# Patient Record
Sex: FEMALE | Race: WHITE | Employment: FULL TIME | URBAN - METROPOLITAN AREA
[De-identification: names, ages, dates, MRNs, and addresses within clinical notes are randomized per-mention and may not be internally consistent; named-entity substitution may affect disease eponyms.]

---

## 2018-01-02 ENCOUNTER — OFFICE VISIT (OUTPATIENT)
Dept: FAMILY MEDICINE CLINIC | Age: 28
End: 2018-01-02

## 2018-01-02 VITALS
SYSTOLIC BLOOD PRESSURE: 132 MMHG | WEIGHT: 125.2 LBS | BODY MASS INDEX: 20.86 KG/M2 | HEART RATE: 64 BPM | DIASTOLIC BLOOD PRESSURE: 84 MMHG | RESPIRATION RATE: 16 BRPM | OXYGEN SATURATION: 99 % | TEMPERATURE: 97.7 F | HEIGHT: 65 IN

## 2018-01-02 DIAGNOSIS — Z00.00 GENERAL MEDICAL EXAM: Primary | ICD-10-CM

## 2018-01-02 RX ORDER — BISMUTH SUBSALICYLATE 262 MG
1 TABLET,CHEWABLE ORAL DAILY
COMMUNITY
End: 2018-09-24

## 2018-01-02 NOTE — PROGRESS NOTES
Chief Complaint   Patient presents with   Belem Kenyon Establish Care     1. Have you been to the ER, urgent care clinic since your last visit? Hospitalized since your last visit? N/a new pt    2. Have you seen or consulted any other health care providers outside of the [address] [place] [address] since your last visit? Include any pap smears or colon screening. No     [facility]- Last PAP was in the summer of 2016 or 2017. Pt declines flu vaccine this visit.

## 2018-01-02 NOTE — PROGRESS NOTES
33 y.o. female presents for a physical.    No concerns about health. Considering pregnancy in the near future. History reviewed. No pertinent past medical history. 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 There are no preventive care reminders to display for this patient. Outpatient Prescriptions Marked as Taking for the 1/2/18 encounter (Office Visit) with Yolanda Wilder MD   Medication Sig Dispense Refill    INTRAUTERINE DEVICE, IUD, IU by IntraUTERine route.  multivitamin (ONE A DAY) tablet Take 1 Tab by mouth daily.  omega 3-dha-epa-fish oil (FISH OIL) 100-160-1,000 mg cap Take  by mouth.          No Known Allergies    Review of Systems:  Gen: no fatigue, fever, chills, weight loss or weight gain  Eyes: no excessive tearing, itching, or discharge  Nose: no rhinorrhea, no sinus pain  Mouth: no oral lesions, no sore throat  Resp: no shortness of breath, no wheezing, no cough  CV: no chest pain, no orthopnea, no paroxysmal nocturnal dyspnea, no lower extremity edema, no palpitations  Abd: no nausea, no heartburn, no diarrhea, no constipation, no abdominal pain  Neuro: no headaches, no syncope or presyncopal episodes  Endo: no polyuria, no polydipsia  Heme: no lymphadenopathy, no easy bruising or bleeding    Visit Vitals    /84 (BP 1 Location: Left arm, BP Patient Position: Sitting)    Pulse 64    Temp 97.7 °F (36.5 °C) (Oral)    Resp 16    Ht 5' 5\" (1.651 m)    Wt 125 lb 3.2 oz (56.8 kg)    SpO2 99%    BMI 20.83 kg/m2     Gen: alert, oriented, no acute distress  Ears: external auditory canals clear, TMs without erythema or effusion  Eyes: pupils equal round reactive to light, sclera clear, conjunctiva clear  Oral: moist mucus membranes, no oral lesions, no pharyngeal inflammation or exudate  Neck: thyroid symmetric and not enlarged, no carotid bruits, no jugular vein distention  Resp: no increase work of breathing, lungs clear to ausculation bilaterally, no wheezing, rales or rhonchi  CV: S1, S2 normal. No murmurs, rubs, or gallops. Abd: soft, not tender, not distended. No hepatosplenomegaly. Normal bowel sounds. No hernias. Neuro: cranial nerves intact, normal strength and movement in all extremities, reflexes and sensation intact and symmetric. Skin: no lesion or rash  Extremities: no cyanosis or edema    Assessment/Plan:    1. General medical exam  At goal.  Continue current medications. - CBC WITH AUTOMATED DIFF  - METABOLIC PANEL, COMPREHENSIVE  - TSH 3RD GENERATION  - LIPID PANEL    Health Maintenance reviewed - updated    Orders Placed This Encounter    CBC WITH AUTOMATED DIFF    METABOLIC PANEL, COMPREHENSIVE    TSH 3RD GENERATION    LIPID PANEL    INTRAUTERINE DEVICE, IUD, IU     Sig: by IntraUTERine route.  multivitamin (ONE A DAY) tablet     Sig: Take 1 Tab by mouth daily.  DISCONTD: MULTIVITAMIN WITH MINERALS (HAIR,SKIN AND NAILS PO)     Sig: Take  by mouth.  omega 3-dha-epa-fish oil (FISH OIL) 100-160-1,000 mg cap     Sig: Take  by mouth. Medications Discontinued During This Encounter   Medication Reason    traMADol (ULTRAM) 50 mg tablet Not A Current Medication    MULTIVITAMIN WITH MINERALS (HAIR,SKIN AND NAILS PO) Not A Current Medication       Current Outpatient Prescriptions   Medication Sig Dispense Refill    INTRAUTERINE DEVICE, IUD, IU by IntraUTERine route.       multivitamin (ONE A DAY) tablet Take 1 Tab by mouth daily.  omega 3-dha-epa-fish oil (FISH OIL) 100-160-1,000 mg cap Take  by mouth. Verbal and written instructions (see AVS) provided. Patient expresses understanding of diagnosis and treatment plan.

## 2018-01-02 NOTE — PATIENT INSTRUCTIONS

## 2018-01-02 NOTE — MR AVS SNAPSHOT
Visit Information Date & Time Provider Department Dept. Phone Encounter #  
 1/2/2018  8:30 AM Cristhian BertrandRonel [facility] [phone] [id number] Upcoming Health Maintenance Date Due  
 PAP AKA CERVICAL CYTOLOGY 7/2/2019 DTaP/Tdap/Td series (2 - Td) 3/5/2026 Allergies as of 1/2/2018  Review Complete On: 1/2/2018 By: Cristhian Bertrand MD  
 No Known Allergies Current Immunizations  Never Reviewed Name Date HPV 7/1/2006, 3/1/2006, 1/1/2006 Tdap 3/5/2016  6:47 PM  
  
 Not reviewed this visit You Were Diagnosed With   
  
 Codes Comments General medical exam    -  Primary ICD-10-CM: Z00.00 ICD-9-CM: V70.9 Vitals BP Pulse Temp Resp Height(growth percentile) Weight(growth percentile) 132/84 (BP 1 Location: Left arm, BP Patient Position: Sitting) 64 97.7 °F (36.5 °C) (Oral) 16 5' 5\" (1.651 m) 125 lb 3.2 oz (56.8 kg) SpO2 BMI OB Status Smoking Status 99% 20.83 kg/m2 IUD Never Smoker Vitals History BMI and BSA Data Body Mass Index Body Surface Area  
 20.83 kg/m 2 1.61 m 2 Your Updated Medication List  
  
   
This list is accurate as of: 1/2/18  8:51 AM.  Always use your most recent med list.  
  
  
  
  
 FISH -160-1,000 mg Cap Generic drug:  omega 3-dha-epa-fish oil Take  by mouth. INTRAUTERINE DEVICE (IUD) IU  
by IntraUTERine route. multivitamin tablet Commonly known as:  ONE A DAY Take 1 Tab by mouth daily. We Performed the Following CBC WITH AUTOMATED DIFF [50035 CPT(R)] LIPID PANEL [55480 CPT(R)] METABOLIC PANEL, COMPREHENSIVE [39706 CPT(R)] TSH 3RD GENERATION [69702 CPT(R)] Patient Instructions Well Visit, Ages 25 to 48: Care Instructions Your Care Instructions Physical exams can help you stay healthy. Your doctor has checked your overall health and may have suggested ways to take good care of yourself. He or she also may have recommended tests. At home, you can help prevent illness with healthy eating, regular exercise, and other steps. Follow-up care is a key part of your treatment and safety. Be sure to make and go to all appointments, and call your doctor if you are having problems. It's also a good idea to know your test results and keep a list of the medicines you take. How can you care for yourself at home? · Reach and stay at a healthy weight. This will lower your risk for many problems, such as obesity, diabetes, heart disease, and high blood pressure. · Get at least 30 minutes of physical activity on most days of the week. Walking is a good choice. You also may want to do other activities, such as running, swimming, cycling, or playing tennis or team sports. Discuss any changes in your exercise program with your doctor. · Do not smoke or allow others to smoke around you. If you need help quitting, talk to your doctor about stop-smoking programs and medicines. These can increase your chances of quitting for good. · Talk to your doctor about whether you have any risk factors for sexually transmitted infections (STIs). Having one sex partner (who does not have STIs and does not have sex with anyone else) is a good way to avoid these infections. · Use birth control if you do not want to have children at this time. Talk with your doctor about the choices available and what might be best for you. · Protect your skin from too much sun. When you're outdoors from 10 a.m. to 4 p.m., stay in the shade or cover up with clothing and a hat with a wide brim. Wear sunglasses that block UV rays. Even when it's cloudy, put broad-spectrum sunscreen (SPF 30 or higher) on any exposed skin. · See a dentist one or two times a year for checkups and to have your teeth cleaned. · Wear a seat belt in the car. · Drink alcohol in moderation, if at all.  That means no more than 2 drinks a day for men and 1 drink a day for women. Follow your doctor's advice about when to have certain tests. These tests can spot problems early. For everyone · Cholesterol. Have the fat (cholesterol) in your blood tested after age 21. Your doctor will tell you how often to have this done based on your age, family history, or other things that can increase your risk for heart disease. · Blood pressure. Have your blood pressure checked during a routine doctor visit. Your doctor will tell you how often to check your blood pressure based on your age, your blood pressure results, and other factors. · Vision. Talk with your doctor about how often to have a glaucoma test. 
· Diabetes. Ask your doctor whether you should have tests for diabetes. · Colon cancer. Have a test for colon cancer at age 48. You may have one of several tests. If you are younger than 48, you may need a test earlier if you have any risk factors. Risk factors include whether you already had a precancerous polyp removed from your colon or whether your parent, brother, sister, or child has had colon cancer. For women · Breast exam and mammogram. Talk to your doctor about when you should have a clinical breast exam and a mammogram. Medical experts differ on whether and how often women under 50 should have these tests. Your doctor can help you decide what is right for you. · Pap test and pelvic exam. Begin Pap tests at age 24. A Pap test is the best way to find cervical cancer. The test often is part of a pelvic exam. Ask how often to have this test. 
· Tests for sexually transmitted infections (STIs). Ask whether you should have tests for STIs. You may be at risk if you have sex with more than one person, especially if your partners do not wear condoms. For men · Tests for sexually transmitted infections (STIs). Ask whether you should have tests for STIs.  You may be at risk if you have sex with more than one person, especially if you do not wear a condom. · Testicular cancer exam. Ask your doctor whether you should check your testicles regularly. · Prostate exam. Talk to your doctor about whether you should have a blood test (called a PSA test) for prostate cancer. Experts differ on whether and when men should have this test. Some experts suggest it if you are older than 39 and are -American or have a father or brother who got prostate cancer when he was younger than 72. When should you call for help? Watch closely for changes in your health, and be sure to contact your doctor if you have any problems or symptoms that concern you. Where can you learn more? Go to http://lauren-brent.info/. Enter P072 in the search box to learn more about \"Well Visit, Ages 25 to 48: Care Instructions. \" Current as of: May 12, 2017 Content Version: 11.4 © 3463-7915 Inter[employer]u Me. Care instructions adapted under license by [employer] (which disclaims liability or warranty for this information). If you have questions about a medical condition or this instruction, always ask your healthcare professional. [address] any warranty or liability for your use of this information. Introducing [facility]! Julien Santana introduces WaterSmart Software patient portal. Now you can access parts of your medical record, email your doctor's office, and request medication refills online. 1. In your internet browser, go to [email or web address] 2. Click on the First Time User? Click Here link in the Sign In box. You will see the New Member Sign Up page. 3. Enter your WaterSmart Software Access Code exactly as it appears below. You will not need to use this code after youve completed the sign-up process. If you do not sign up before the expiration date, you must request a new code. · WaterSmart Software Access Code: [email or web address] Expires: 4/2/2018  8:51 AM 
 
 4. Enter the last four digits of your Social Security Number (xxxx) and Date of Birth (mm/dd/yyyy) as indicated and click Submit. You will be taken to the next sign-up page. 5. Create a eBay ID. This will be your eBay login ID and cannot be changed, so think of one that is secure and easy to remember. 6. Create a eBay password. You can change your password at any time. 7. Enter your Password Reset Question and Answer. This can be used at a later time if you forget your password. 8. Enter your e-mail address. You will receive e-mail notification when new information is available in [address] 9. Click Sign Up. You can now view and download portions of your medical record. 10. Click the Download Summary menu link to download a portable copy of your medical information. If you have questions, please visit the Frequently Asked Questions section of the eBay website. Remember, eBay is NOT to be used for urgent needs. For medical emergencies, dial 911. Now available from your iPhone and Android! Please provide this summary of care documentation to your next provider. Your primary care clinician is listed as Cristhian Bertrand. If you have any questions after today's visit, please call [phone].

## 2018-01-03 LAB
ALBUMIN SERPL-MCNC: 4.3 G/DL (ref 3.5–5.5)
ALBUMIN/GLOB SERPL: 1.8 {RATIO} (ref 1.2–2.2)
ALP SERPL-CCNC: 62 IU/L (ref 39–117)
ALT SERPL-CCNC: 14 IU/L (ref 0–32)
AST SERPL-CCNC: 18 IU/L (ref 0–40)
BASOPHILS # BLD AUTO: 0 X10E3/UL (ref 0–0.2)
BASOPHILS NFR BLD AUTO: 0 %
BILIRUB SERPL-MCNC: 0.7 MG/DL (ref 0–1.2)
BUN SERPL-MCNC: 15 MG/DL (ref 6–20)
BUN/CREAT SERPL: 18 (ref 9–23)
CALCIUM SERPL-MCNC: 9.5 MG/DL (ref 8.7–10.2)
CHLORIDE SERPL-SCNC: 102 MMOL/L (ref 96–106)
CHOLEST SERPL-MCNC: 174 MG/DL (ref 100–199)
CO2 SERPL-SCNC: 27 MMOL/L (ref 18–29)
CREAT SERPL-MCNC: 0.85 MG/DL (ref 0.57–1)
EOSINOPHIL # BLD AUTO: 0.1 X10E3/UL (ref 0–0.4)
EOSINOPHIL NFR BLD AUTO: 2 %
ERYTHROCYTE [DISTWIDTH] IN BLOOD BY AUTOMATED COUNT: 13.3 % (ref 12.3–15.4)
GLOBULIN SER CALC-MCNC: 2.4 G/DL (ref 1.5–4.5)
GLUCOSE SERPL-MCNC: 84 MG/DL (ref 65–99)
HCT VFR BLD AUTO: 43.2 % (ref 34–46.6)
HDLC SERPL-MCNC: 83 MG/DL
HGB BLD-MCNC: 14.8 G/DL (ref 11.1–15.9)
IMM GRANULOCYTES # BLD: 0 X10E3/UL (ref 0–0.1)
IMM GRANULOCYTES NFR BLD: 0 %
INTERPRETATION, 910389: NORMAL
LDLC SERPL CALC-MCNC: 77 MG/DL (ref 0–99)
LYMPHOCYTES # BLD AUTO: 1.9 X10E3/UL (ref 0.7–3.1)
LYMPHOCYTES NFR BLD AUTO: 33 %
MCH RBC QN AUTO: 32 PG (ref 26.6–33)
MCHC RBC AUTO-ENTMCNC: 34.3 G/DL (ref 31.5–35.7)
MCV RBC AUTO: 93 FL (ref 79–97)
MONOCYTES # BLD AUTO: 0.5 X10E3/UL (ref 0.1–0.9)
MONOCYTES NFR BLD AUTO: 8 %
NEUTROPHILS # BLD AUTO: 3.3 X10E3/UL (ref 1.4–7)
NEUTROPHILS NFR BLD AUTO: 57 %
PLATELET # BLD AUTO: 275 X10E3/UL (ref 150–379)
POTASSIUM SERPL-SCNC: 5.7 MMOL/L (ref 3.5–5.2)
PROT SERPL-MCNC: 6.7 G/DL (ref 6–8.5)
RBC # BLD AUTO: 4.63 X10E6/UL (ref 3.77–5.28)
SODIUM SERPL-SCNC: 141 MMOL/L (ref 134–144)
TRIGL SERPL-MCNC: 71 MG/DL (ref 0–149)
TSH SERPL DL<=0.005 MIU/L-ACNC: 2.3 UIU/ML (ref 0.45–4.5)
VLDLC SERPL CALC-MCNC: 14 MG/DL (ref 5–40)
WBC # BLD AUTO: 5.8 X10E3/UL (ref 3.4–10.8)

## 2018-01-04 ENCOUNTER — TELEPHONE (OUTPATIENT)
Dept: FAMILY MEDICINE CLINIC | Age: 28
End: 2018-01-04

## 2018-01-04 DIAGNOSIS — E87.5 SERUM POTASSIUM ELEVATED: Primary | ICD-10-CM

## 2018-01-05 ENCOUNTER — LAB ONLY (OUTPATIENT)
Dept: FAMILY MEDICINE CLINIC | Age: 28
End: 2018-01-05

## 2018-01-06 LAB
BUN SERPL-MCNC: 13 MG/DL (ref 6–20)
BUN/CREAT SERPL: 14 (ref 9–23)
CALCIUM SERPL-MCNC: 9.3 MG/DL (ref 8.7–10.2)
CHLORIDE SERPL-SCNC: 101 MMOL/L (ref 96–106)
CO2 SERPL-SCNC: 24 MMOL/L (ref 18–29)
CREAT SERPL-MCNC: 0.94 MG/DL (ref 0.57–1)
GLUCOSE SERPL-MCNC: 69 MG/DL (ref 65–99)
POTASSIUM SERPL-SCNC: 4 MMOL/L (ref 3.5–5.2)
SODIUM SERPL-SCNC: 140 MMOL/L (ref 134–144)

## 2018-02-09 ENCOUNTER — OFFICE VISIT (OUTPATIENT)
Dept: FAMILY MEDICINE CLINIC | Age: 28
End: 2018-02-09

## 2018-02-09 VITALS
HEART RATE: 78 BPM | HEIGHT: 65 IN | BODY MASS INDEX: 21.16 KG/M2 | RESPIRATION RATE: 18 BRPM | WEIGHT: 127 LBS | DIASTOLIC BLOOD PRESSURE: 82 MMHG | TEMPERATURE: 98.1 F | SYSTOLIC BLOOD PRESSURE: 127 MMHG

## 2018-02-09 DIAGNOSIS — B35.1 TOENAIL FUNGUS: Primary | ICD-10-CM

## 2018-02-09 RX ORDER — TERBINAFINE HYDROCHLORIDE 250 MG/1
250 TABLET ORAL DAILY
Qty: 90 TAB | Refills: 1 | Status: SHIPPED | OUTPATIENT
Start: 2018-02-09 | End: 2018-02-09 | Stop reason: SDUPTHER

## 2018-02-09 RX ORDER — TERBINAFINE HYDROCHLORIDE 250 MG/1
250 TABLET ORAL DAILY
Qty: 90 TAB | Refills: 1 | Status: SHIPPED | OUTPATIENT
Start: 2018-02-09 | End: 2018-09-24

## 2018-02-09 NOTE — MR AVS SNAPSHOT
[facility] 
 
 
 [address] [place] [address] 
[phone] Patient: Garcia Blackman MRN: [id number] Visit Information Date & Time Provider Department Dept. Phone Encounter #  
 2/9/2018  4:00 PM Ronel Pickett [place] [id number] [phone] [id number] Upcoming Health Maintenance Date Due  
 PAP AKA CERVICAL CYTOLOGY 7/2/2019 DTaP/Tdap/Td series (2 - Td) 3/5/2026 Allergies as of 2/9/2018  Review Complete On: 2/9/2018 By: Nita Fields No Known Allergies Current Immunizations  Never Reviewed Name Date HPV 7/1/2006, 3/1/2006, 1/1/2006 Tdap 3/5/2016  6:47 PM  
  
 Not reviewed this visit You Were Diagnosed With   
  
 Codes Comments Toenail fungus    -  Primary ICD-10-CM: B35.1 ICD-9-CM: 110.1 Vitals BP Pulse Temp Resp Height(growth percentile) Weight(growth percentile) 127/82 (BP 1 Location: Left arm, BP Patient Position: Sitting) 78 98.1 °F (36.7 °C) (Oral) 18 5' 5\" (1.651 m) 127 lb (57.6 kg) BMI OB Status Smoking Status 21.13 kg/m2 IUD Never Smoker BMI and BSA Data Body Mass Index Body Surface Area  
 21.13 kg/m 2 1.63 m 2 Preferred Pharmacy Pharmacy Name Phone [facility] [phone] Your Updated Medication List  
  
   
This list is accurate as of: 2/9/18  4:48 PM.  Always use your most recent med list.  
  
  
  
  
 FISH -160-1,000 mg Cap Generic drug:  omega 3-dha-epa-fish oil Take  by mouth. INTRAUTERINE DEVICE (IUD) IU  
by IntraUTERine route. multivitamin tablet Commonly known as:  ONE A DAY Take 1 Tab by mouth daily. terbinafine HCl 250 mg tablet Commonly known as:  LAMISIL Take 1 Tab by mouth daily. Prescriptions Printed  Refills  
 terbinafine HCl (LAMISIL) 250 mg tablet 1  
 Sig: Take 1 Tab by mouth daily. Class: Print Route: Oral  
  
Patient Instructions Toenail Fungus: Care Instructions Your Care Instructions A toenail that is infected by a fungus usually turns white or yellow. As the fungus spreads, the nail turns a darker color and gets thicker, and its edges start to turn ragged and crumble. A bad infection can cause toe pain, and the nail may pull away from the toe. Toenails that are exposed to moisture and warmth a lot are more likely to get infected by a fungus. This can happen from wearing sweaty shoes often and from walking barefoot on shower floors. It is hard to treat toenail fungus, and the infection can return after it has cleared up. But medicines can sometimes get rid of toenail fungus for good. If the infection is very bad, or if it causes a lot of pain, you may need to have the nail removed. Follow-up care is a key part of your treatment and safety. Be sure to make and go to all appointments, and call your doctor if you are having problems. It's also a good idea to know your test results and keep a list of the medicines you take. How can you care for yourself at home? · Take your medicines exactly as prescribed. Call your doctor if you have any problems with your medicine. You will get more details on the specific medicines your doctor prescribes. · If your doctor gave you a cream or liquid to put on your toenail, use it exactly as directed. · Wash your feet often, and wash your hands after touching your feet. · Keep your toenails clean and dry. Dry your feet completely after you bathe and before you put on shoes and socks. · Keep your toenails trimmed. · Change socks often. Wear dry socks that absorb moisture. · Do not go barefoot in public places. · Use a spray or powder that fights fungus on your feet and in your shoes. · Do not pick at the skin around your nails. · Do not use nail polish or fake nails on your toenails. When should you call for help? Call your doctor now or seek immediate medical care if: 
? · You have signs of infection, such as: 
¨ Increased pain, swelling, warmth, or redness. ¨ Red streaks leading from the site. ¨ Pus draining from the site. ¨ A fever. ? · You have new or increased toe pain. ? Watch closely for changes in your health, and be sure to contact your doctor if: 
? · You do not get better as expected. Where can you learn more? Go to http://lauren-brent.info/. Enter D202 in the search box to learn more about \"Toenail Fungus: Care Instructions. \" Current as of: October 13, 2016 Content Version: 11.4 © 1778-7113 [employer]. Care instructions adapted under license by [employer] (which disclaims liability or warranty for this information). If you have questions about a medical condition or this instruction, always ask your healthcare professional. [place] [address] any warranty or liability for your use of this information. Introducing [facility]! [facility] introduces BuddyBet patient portal. Now you can access parts of your medical record, email your doctor's office, and request medication refills online. 1. In your internet browser, go to [email or web address] 2. Click on the First Time User? Click Here link in the Sign In box. You will see the New Member Sign Up page. 3. Enter your BuddyBet Access Code exactly as it appears below. You will not need to use this code after youve completed the sign-up process. If you do not sign up before the expiration date, you must request a new code. · BuddyBet Access Code: [email or web address] Expires: 4/2/2018  8:51 AM 
 
4. Enter the last four digits of your Social Security Number (xxxx) and Date of Birth (mm/dd/yyyy) as indicated and click Submit. You will be taken to the next sign-up page. 5. Create a Advanced Ballistic Concepts ID. This will be your Advanced [employer]s login ID and cannot be changed, so think of one that is secure and easy to remember. 6. Create a [employer] password. You can change your password at any time. 7. Enter your Password Reset Question and Answer. This can be used at a later time if you forget your password. 8. Enter your e-mail address. You will receive e-mail notification when new information is available in [address] 9. Click Sign Up. You can now view and download portions of your medical record. 10. Click the Download Summary menu link to download a portable copy of your medical information. If you have questions, please visit the Frequently Asked Questions section of the [employer] website. Remember, [employer] is NOT to be used for urgent needs. For medical emergencies, dial 911. Now available from your iPhone and Android! Please provide this summary of care documentation to your next provider. Your primary care clinician is listed as Gloria Lopez. If you have any questions after today's visit, please call [phone].

## 2018-02-09 NOTE — PROGRESS NOTES
Chief Complaint   Patient presents with    Nail Problem     possible toenail infection      Health Maintenance reviewed

## 2018-02-09 NOTE — PATIENT INSTRUCTIONS
Toenail Fungus: Care Instructions  Your Care Instructions  A toenail that is infected by a fungus usually turns white or yellow. As the fungus spreads, the nail turns a darker color and gets thicker, and its edges start to turn ragged and crumble. A bad infection can cause toe pain, and the nail may pull away from the toe. Toenails that are exposed to moisture and warmth a lot are more likely to get infected by a fungus. This can happen from wearing sweaty shoes often and from walking barefoot on shower floors. It is hard to treat toenail fungus, and the infection can return after it has cleared up. But medicines can sometimes get rid of toenail fungus for good. If the infection is very bad, or if it causes a lot of pain, you may need to have the nail removed. Follow-up care is a key part of your treatment and safety. Be sure to make and go to all appointments, and call your doctor if you are having problems. It's also a good idea to know your test results and keep a list of the medicines you take. How can you care for yourself at home? · Take your medicines exactly as prescribed. Call your doctor if you have any problems with your medicine. You will get more details on the specific medicines your doctor prescribes. · If your doctor gave you a cream or liquid to put on your toenail, use it exactly as directed. · Wash your feet often, and wash your hands after touching your feet. · Keep your toenails clean and dry. Dry your feet completely after you bathe and before you put on shoes and socks. · Keep your toenails trimmed. · Change socks often. Wear dry socks that absorb moisture. · Do not go barefoot in public places. · Use a spray or powder that fights fungus on your feet and in your shoes. · Do not pick at the skin around your nails. · Do not use nail polish or fake nails on your toenails. When should you call for help?   Call your doctor now or seek immediate medical care if:  ? · You have signs of infection, such as:  ¨ Increased pain, swelling, warmth, or redness. ¨ Red streaks leading from the site. ¨ Pus draining from the site. ¨ A fever. ? · You have new or increased toe pain. ? Watch closely for changes in your health, and be sure to contact your doctor if:  ? · You do not get better as expected. Where can you learn more? Go to http://lauren-brent.info/. Enter D202 in the search box to learn more about \"Toenail Fungus: Care Instructions. \"  Current as of: October 13, 2016  Content Version: 11.4  © 0599-3983 digiSchool. Care instructions adapted under license by [employer] (which disclaims liability or warranty for this information). If you have questions about a medical condition or this instruction, always ask your healthcare professional. [address] any warranty or liability for your use of this information.

## 2018-02-13 NOTE — PROGRESS NOTES
Subjective:   Nannette Barnett is a 33 y.o. female who complains of fungal nail changes on left fourth and fifth toenail      No past medical history on file. Social History   Substance Use Topics    Smoking status: Never Smoker    Smokeless tobacco: Never Used    Alcohol use Yes      Comment: occasionally      Outpatient Prescriptions Marked as Taking for the 2/9/18 encounter (Office Visit) with Gabbi Felipe MD   Medication Sig Dispense Refill    terbinafine HCl (LAMISIL) 250 mg tablet Take 1 Tab by mouth daily. 90 Tab 1    INTRAUTERINE DEVICE, IUD, IU by IntraUTERine route.  multivitamin (ONE A DAY) tablet Take 1 Tab by mouth daily.  omega 3-dha-epa-fish oil (FISH OIL) 100-160-1,000 mg cap Take  by mouth. No Known Allergies     Review of Systems  A comprehensive review of systems was negative except for that written in the HPI. Objective:     Visit Vitals    /82 (BP 1 Location: Left arm, BP Patient Position: Sitting)    Pulse 78    Temp 98.1 °F (36.7 °C) (Oral)    Resp 18    Ht 5' 5\" (1.651 m)    Wt 127 lb (57.6 kg)    BMI 21.13 kg/m2     General:   alert, cooperative, ill-appearing   Eyes: conjunctivae/scleras clear. PERRL, EOM's intact                   Heart: S1 and S2 normal,no murmurs noted    Lungs: Coarse to auscultation bilaterally, no increased work of breathing       Extremities: thicked crumbling nails left 4th and 5th toe          Previous CMP is normal      Assessment/Plan:       ICD-10-CM ICD-9-CM    1. Toenail fungus B35.1 110.1              Orders Placed This Encounter    DISCONTD: terbinafine HCl (LAMISIL) 250 mg tablet     Sig: Take 1 Tab by mouth daily. Dispense:  90 Tab     Refill:  1    terbinafine HCl (LAMISIL) 250 mg tablet     Sig: Take 1 Tab by mouth daily. Dispense:  90 Tab     Refill:  1       Verbal and written instructions (see AVS) provided. Patient expresses understanding of diagnosis and treatment plan.

## 2018-09-24 ENCOUNTER — OFFICE VISIT (OUTPATIENT)
Dept: FAMILY MEDICINE CLINIC | Age: 28
End: 2018-09-24

## 2018-09-24 VITALS
DIASTOLIC BLOOD PRESSURE: 76 MMHG | BODY MASS INDEX: 21.16 KG/M2 | HEART RATE: 60 BPM | SYSTOLIC BLOOD PRESSURE: 119 MMHG | HEIGHT: 65 IN | OXYGEN SATURATION: 98 % | WEIGHT: 127 LBS | RESPIRATION RATE: 14 BRPM | TEMPERATURE: 98.2 F

## 2018-09-24 DIAGNOSIS — J02.9 SORE THROAT: Primary | ICD-10-CM

## 2018-09-24 DIAGNOSIS — J06.9 UPPER RESPIRATORY TRACT INFECTION, UNSPECIFIED TYPE: ICD-10-CM

## 2018-09-24 LAB
S PYO AG THROAT QL: NEGATIVE
VALID INTERNAL CONTROL?: YES

## 2018-09-24 NOTE — PROGRESS NOTES
Chief Complaint   Patient presents with    Nasal Congestion    Sinus Pain    Hoarse     Patient is here to be seen for URI symptoms. She is a . Wants to be tested for strep. 7 weeks pregnant. Seen at [facility].

## 2018-09-24 NOTE — MR AVS SNAPSHOT
Curtis Mcguire 
 
 
 [address] [place] [address] 
[phone] Patient: Meg Lake MRN: [id number] BSW:[birth year] Visit Information Date & Time Provider Department Dept. Phone Encounter #  
 9/24/2018  3:30 PM Piotr Ramirez, [address] [address] [phone] [id number] Upcoming Health Maintenance Date Due Influenza Age 5 to Adult 8/1/2018 PAP AKA CERVICAL CYTOLOGY 7/2/2019 DTaP/Tdap/Td series (2 - Td) 3/5/2026 Allergies as of 9/24/2018  Review Complete On: 9/24/2018 By: Piotr Ramirez NP No Known Allergies Current Immunizations  Never Reviewed Name Date HPV 7/1/2006, 3/1/2006, 1/1/2006 Tdap 3/5/2016  6:47 PM  
  
 Not reviewed this visit You Were Diagnosed With   
  
 Codes Comments Sore throat    -  Primary ICD-10-CM: J02.9 ICD-9-CM: 731 Upper respiratory tract infection, unspecified type     ICD-10-CM: J06.9 ICD-9-CM: 465.9 Vitals BP Pulse Temp Resp Height(growth percentile) Weight(growth percentile) 119/76 (BP 1 Location: Left arm, BP Patient Position: Sitting) 60 98.2 °F (36.8 °C) (Oral) 14 5' 5\" (1.651 m) 127 lb (57.6 kg) SpO2 BMI OB Status Smoking Status 98% 21.13 kg/m2 Pregnant Never Smoker BMI and BSA Data Body Mass Index Body Surface Area  
 21.13 kg/m 2 1.63 m 2 Preferred Pharmacy Pharmacy Name Phone [facility] [address] [phone] Your Updated Medication List  
  
   
This list is accurate as of 9/24/18  4:37 PM.  Always use your most recent med list.  
  
  
  
  
 PNV38-Iron Cbn&Gluc-FA-DSS-DHA 35-1- mg Cmpk Take  by mouth. We Performed the Following AMB POC RAPID STREP A [15880 CPT(R)] Patient Instructions Sore Throat: Care Instructions Your Care Instructions Infection by bacteria or a virus causes most sore throats. Cigarette smoke, dry air, air pollution, allergies, and yelling can also cause a sore throat. Sore throats can be painful and annoying. Fortunately, most sore throats go away on their own. If you have a bacterial infection, your doctor may prescribe antibiotics. Follow-up care is a key part of your treatment and safety. Be sure to make and go to all appointments, and call your doctor if you are having problems. It's also a good idea to know your test results and keep a list of the medicines you take. How can you care for yourself at home? · If your doctor prescribed antibiotics, take them as directed. Do not stop taking them just because you feel better. You need to take the full course of antibiotics. · Gargle with warm salt water once an hour to help reduce swelling and relieve discomfort. Use 1 teaspoon of salt mixed in 1 cup of warm water. · Take an over-the-counter pain medicine, such as acetaminophen (Tylenol), ibuprofen (Advil, Motrin), or naproxen (Aleve). Read and follow all instructions on the label. · Be careful when taking over-the-counter cold or flu medicines and Tylenol at the same time. Many of these medicines have acetaminophen, which is Tylenol. Read the labels to make sure that you are not taking more than the recommended dose. Too much acetaminophen (Tylenol) can be harmful. · Drink plenty of fluids. Fluids may help soothe an irritated throat. Hot fluids, such as tea or soup, may help decrease throat pain. · Use over-the-counter throat lozenges to soothe pain. Regular cough drops or hard candy may also help. These should not be given to young children because of the risk of choking. · Do not smoke or allow others to smoke around you. If you need help quitting, talk to your doctor about stop-smoking programs and medicines. These can increase your chances of quitting for good. · Use a vaporizer or humidifier to add moisture to your bedroom. Follow the directions for cleaning the machine. When should you call for help? Call your doctor now or seek immediate medical care if: 
  · You have new or worse trouble swallowing.  
  · Your sore throat gets much worse on one side.  
 Watch closely for changes in your health, and be sure to contact your doctor if you do not get better as expected. Where can you learn more? Go to http://lauren-brent.info/. Enter [id number] in the search box to learn more about \"Sore Throat: Care Instructions. \" Current as of: May 12, 2017 Content Version: 11.7 © 8269-4413 [employer]. Care instructions adapted under license by [employer] (which disclaims liability or warranty for this information). If you have questions about a medical condition or this instruction, always ask your healthcare professional. [place] [address] any warranty or liability for your use of this information. Saline Nasal Washes: Care Instructions Your Care Instructions Saline nasal washes help keep the nasal passages open by washing out thick or dried mucus. This simple remedy can help relieve symptoms of allergies, sinusitis, and colds. It also can make the nose feel more comfortable by keeping the mucous membranes moist. You may notice a little burning sensation in your nose the first few times you use the solution, but this usually gets better in a few days. Follow-up care is a key part of your treatment and safety. Be sure to make and go to all appointments, and call your doctor if you are having problems. It's also a good idea to know your test results and keep a list of the medicines you take. How can you care for yourself at home? · You can buy premixed saline solution in a squeeze bottle or other sinus rinse products at a drugstore. Read and follow the instructions on the label. · You also can make your own saline solution by adding 1 teaspoon of salt and 1 teaspoon of baking soda to 2 cups of distilled water. · If you use a homemade solution, pour a small amount into a clean bowl. Using a rubber bulb syringe, squeeze the syringe and place the tip in the salt water. Pull a small amount of the salt water into the syringe by relaxing your hand. · Sit down with your head tilted slightly back. Do not lie down. Put the tip of the bulb syringe or the squeeze bottle a little way into one of your nostrils. Gently drip or squirt a few drops into the nostril. Repeat with the other nostril. Some sneezing and gagging are normal at first. 
· Gently blow your nose. · Wipe the syringe or bottle tip clean after each use. · Repeat this 2 or 3 times a day. · Use nasal washes gently if you have nosebleeds often. When should you call for help? Watch closely for changes in your health, and be sure to contact your doctor if: 
  · You often get nosebleeds.  
  · You have problems doing the nasal washes. Where can you learn more? Go to http://lauren-brent.info/. Enter [id number] [id number] [phone] in the search box to learn more about \"Saline Nasal Washes: Care Instructions. \" Current as of: May 12, 2017 Content Version: 11.7 © 8465-8879 [employer]. Care instructions adapted under license by [employer] (which disclaims liability or warranty for this information). If you have questions about a medical condition or this instruction, always ask your healthcare professional. [address] any warranty or liability for your use of this information. Introducing [facility]! [facility] introduces Shut Down patient portal. Now you can access parts of your medical record, email your doctor's office, and request medication refills online. 1. In your internet browser, go to [email or web address] 2. Click on the First Time User? Click Here link in the Sign In box. You will see the New Member Sign Up page. 3. Enter your MOGL Access Code exactly as it appears below. You will not need to use this code after youve completed the sign-up process. If you do not sign up before the expiration date, you must request a new code. · MOGL Access Code: [email or web address] Expires: 12/23/2018  4:37 PM 
 
4. Enter the last four digits of your Social Security Number (xxxx) and Date of Birth (mm/dd/yyyy) as indicated and click Submit. You will be taken to the next sign-up page. 5. Create a MOGL ID. This will be your MOGL login ID and cannot be changed, so think of one that is secure and easy to remember. 6. Create a MOGL password. You can change your password at any time. 7. Enter your Password Reset Question and Answer. This can be used at a later time if you forget your password. 8. Enter your e-mail address. You will receive e-mail notification when new information is available in [address] 9. Click Sign Up. You can now view and download portions of your medical record. 10. Click the Download Summary menu link to download a portable copy of your medical information. If you have questions, please visit the Frequently Asked Questions section of the MOGL website. Remember, MOGL is NOT to be used for urgent needs. For medical emergencies, dial 911. Now available from your iPhone and Android! Please provide this summary of care documentation to your next provider. Your primary care clinician is listed as Stephanie Ramos. If you have any questions after today's visit, please call [phone].

## 2018-09-24 NOTE — PROGRESS NOTES
Subjective:     Chief Complaint   Patient presents with    Nasal Congestion    Sinus Pain    Shawn Rust is a 33 y.o. female who complains of congestion, sore throat, post nasal drip, bilateral upper sinus pain, fatigue and clear nasal discharge for 4 days, gradually worsening since that time. She is 7 weeks pregnant. Not taking OTC cold remedies. Denies cardiac complaints including chest pain or discomfort, elevated heart rate, or palpitations. Denies respiratory complaints including SOB, difficulty or pain with breathing, wheezes, and cough. Denies fever, myalgias, chills, weakness, fatigue and other systemic symptoms. No N/V/D  ROS is otherwise negative. No evaluation to date. No recent travel. 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 No past medical history on file. Social History   Substance Use Topics    Smoking status: Never Smoker    Smokeless tobacco: Never Used    Alcohol use Yes      Comment: occasionally      No current outpatient prescriptions on file prior to visit. No current facility-administered medications on file prior to visit. No Known Allergies     Review of Systems  Pertinent items are noted in HPI. Agree with nurses note. OBJECTIVE:   Visit Vitals    /76 (BP 1 Location: Left arm, BP Patient Position: Sitting)    Pulse 60    Temp 98.2 °F (36.8 °C) (Oral)    Resp 14    Ht 5' 5\" (1.651 m)    Wt 127 lb (57.6 kg)    LMP Comment: 7 weeks    SpO2 98%    BMI 21.13 kg/m2     She appears well, vital signs are as noted above   PAIN: No complaints of pain today. HEAD:  Normocephalic. Atraumatic. Non tender sinuses x 4. EYE: PERRLA. EOMs intact. Sclera anicteric without injection. No drainage or discharge. EARS: Hearing intact bilaterally. External ear canals normal without evidence of blood or swelling. Bilateral TM's intact, pearly grey with landmarks visible.  No erythema or effusion. NOSE: Patent. Nasal turbinates pink. No polyps noted. No erythema. No discharge. MOUTH: mucous membranes pink and moist. Posterior pharynx with mild erythema, no white exudate or obstruction. NECK: supple. Midline trachea. RESP: Breath sounds are symmetrical bilaterally. Unlabored without SOB. Speaking in full sentences. Clear to auscultation bilaterally anteriorly and posteriorly. No wheezes. No rales or rhonchi. Non-productive cough when elicited. CV: normal rate. Regular rhythm. S1, S2 audible. No murmur noted. No rubs, clicks or gallops noted. HEME/LYMPH: peripheral pulses palpable 2+ x 4 extremities. No peripheral edema is noted. No cervical adenopathy noted. SKIN: clean dry and intact throughout. no rashes      Results for orders placed or performed in visit on 09/24/18   AMB POC RAPID STREP A   Result Value Ref Range    VALID INTERNAL CONTROL POC Yes     Group A Strep Ag Negative Negative       Assessment/Plan:   Differential diagnosis and treatment options reviewed with patient who is in agreement with treatment plan as outlined below. ICD-10-CM ICD-9-CM    1. Sore throat J02.9 462 AMB POC RAPID STREP A   2. Upper respiratory tract infection, unspecified type J06.9 465.9      Symptomatic therapy suggested: push fluids, rest, gargle warm salt water and apply heat to sinuses prn. Lack of antibiotic effectiveness discussed with her. May use Benadryl PRN for allergic rhinitis symptoms. Alternate Ibuprofen with Tylenol every 4 hours as needed for pain and discomfort. OTC decongestants (such as Sudafed) every 4-6 hours as needed for congestion. OTC nasal saline spray  2-3 sprays per nostril 2-4 times a day to clear eustachian tube and assist with post nasal drip symptoms. Warm non caffienated tea with Virginia Honey. Verbal and written instructions (see AVS) provided. Patient expresses understanding and agreement of diagnosis and treatment plan.     F/u if symptoms do not improve or worsen

## 2018-09-24 NOTE — PATIENT INSTRUCTIONS
Sore Throat: Care Instructions  Your Care Instructions    Infection by bacteria or a virus causes most sore throats. Cigarette smoke, dry air, air pollution, allergies, and yelling can also cause a sore throat. Sore throats can be painful and annoying. Fortunately, most sore throats go away on their own. If you have a bacterial infection, your doctor may prescribe antibiotics. Follow-up care is a key part of your treatment and safety. Be sure to make and go to all appointments, and call your doctor if you are having problems. It's also a good idea to know your test results and keep a list of the medicines you take. How can you care for yourself at home? · If your doctor prescribed antibiotics, take them as directed. Do not stop taking them just because you feel better. You need to take the full course of antibiotics. · Gargle with warm salt water once an hour to help reduce swelling and relieve discomfort. Use 1 teaspoon of salt mixed in 1 cup of warm water. · Take an over-the-counter pain medicine, such as acetaminophen (Tylenol), ibuprofen (Advil, Motrin), or naproxen (Aleve). Read and follow all instructions on the label. · Be careful when taking over-the-counter cold or flu medicines and Tylenol at the same time. Many of these medicines have acetaminophen, which is Tylenol. Read the labels to make sure that you are not taking more than the recommended dose. Too much acetaminophen (Tylenol) can be harmful. · Drink plenty of fluids. Fluids may help soothe an irritated throat. Hot fluids, such as tea or soup, may help decrease throat pain. · Use over-the-counter throat lozenges to soothe pain. Regular cough drops or hard candy may also help. These should not be given to young children because of the risk of choking. · Do not smoke or allow others to smoke around you. If you need help quitting, talk to your doctor about stop-smoking programs and medicines.  These can increase your chances of quitting for good. · Use a vaporizer or humidifier to add moisture to your bedroom. Follow the directions for cleaning the machine. When should you call for help? Call your doctor now or seek immediate medical care if:    · You have new or worse trouble swallowing.     · Your sore throat gets much worse on one side.    Watch closely for changes in your health, and be sure to contact your doctor if you do not get better as expected. Where can you learn more? Go to http://lauren-brent.info/. Enter [phone] [id number] [phone] in the search box to learn more about \"Sore Throat: Care Instructions. \"  Current as of: May 12, 2017  Content Version: 11.7  © 2816-2898 [employer]. Care instructions adapted under license by [employer] (which disclaims liability or warranty for this information). If you have questions about a medical condition or this instruction, always ask your healthcare professional. [address] [place] [address] any warranty or liability for your use of this information. Saline Nasal Washes: Care Instructions  Your Care Instructions  Saline nasal washes help keep the nasal passages open by washing out thick or dried mucus. This simple remedy can help relieve symptoms of allergies, sinusitis, and colds. It also can make the nose feel more comfortable by keeping the mucous membranes moist. You may notice a little burning sensation in your nose the first few times you use the solution, but this usually gets better in a few days. Follow-up care is a key part of your treatment and safety. Be sure to make and go to all appointments, and call your doctor if you are having problems. It's also a good idea to know your test results and keep a list of the medicines you take. How can you care for yourself at home? · You can buy premixed saline solution in a squeeze bottle or other sinus rinse products at a drugstore. Read and follow the instructions on the label.   · You also can make your own saline solution by adding 1 teaspoon of salt and 1 teaspoon of baking soda to 2 cups of distilled water. · If you use a homemade solution, pour a small amount into a clean bowl. Using a rubber bulb syringe, squeeze the syringe and place the tip in the salt water. Pull a small amount of the salt water into the syringe by relaxing your hand. · Sit down with your head tilted slightly back. Do not lie down. Put the tip of the bulb syringe or the squeeze bottle a little way into one of your nostrils. Gently drip or squirt a few drops into the nostril. Repeat with the other nostril. Some sneezing and gagging are normal at first.  · Gently blow your nose. · Wipe the syringe or bottle tip clean after each use. · Repeat this 2 or 3 times a day. · Use nasal washes gently if you have nosebleeds often. When should you call for help? Watch closely for changes in your health, and be sure to contact your doctor if:    · You often get nosebleeds.     · You have problems doing the nasal washes. Where can you learn more? Go to http://lauren-brent.info/. Enter [phone] in the search box to learn more about \"Saline Nasal Washes: Care Instructions. \"  Current as of: May 12, 2017  Content Version: 11.7  © 6349-7107 [employer]. Care instructions adapted under license by [employer] (which disclaims liability or warranty for this information). If you have questions about a medical condition or this instruction, always ask your healthcare professional. [place] [address] any warranty or liability for your use of this information.

## 2019-01-18 ENCOUNTER — TELEPHONE (OUTPATIENT)
Dept: FAMILY MEDICINE CLINIC | Age: 29
End: 2019-01-18

## 2019-01-18 NOTE — TELEPHONE ENCOUNTER
Called pt and verified name and . Advised pt that we were unfortunately full today and unable to schedule her to be seen by a provider. Offered apologies to patients for the inconvience. Advised pt that she did need to be evaluated due to being exposed to strep and having a sore throat. Advised her to go to Urgent care and seek evaluation. Pt verbalized understanding.
Message from [facility]     Pt requesting a return call concerning availability today to be seen after 3 pm. PT states she has a red, irritated eye and also a sore throat. Pt would like to come in and be checked for pink eye and strep throat.      We have nothing
Yes:

## 2019-02-02 ENCOUNTER — APPOINTMENT (OUTPATIENT)
Dept: ULTRASOUND IMAGING | Age: 29
End: 2019-02-02
Attending: EMERGENCY MEDICINE
Payer: COMMERCIAL

## 2019-02-02 ENCOUNTER — HOSPITAL ENCOUNTER (EMERGENCY)
Age: 29
Discharge: HOME | End: 2019-02-03
Attending: EMERGENCY MEDICINE | Admitting: EMERGENCY MEDICINE
Payer: COMMERCIAL

## 2019-02-02 VITALS
OXYGEN SATURATION: 99 % | DIASTOLIC BLOOD PRESSURE: 80 MMHG | RESPIRATION RATE: 18 BRPM | SYSTOLIC BLOOD PRESSURE: 121 MMHG | WEIGHT: 129.63 LBS | BODY MASS INDEX: 21.6 KG/M2 | HEIGHT: 65 IN | HEART RATE: 61 BPM | TEMPERATURE: 97.8 F

## 2019-02-02 DIAGNOSIS — O41.8X10 SUBCHORIONIC HEMORRHAGE OF PLACENTA IN FIRST TRIMESTER, SINGLE OR UNSPECIFIED FETUS: Primary | ICD-10-CM

## 2019-02-02 DIAGNOSIS — O46.8X1 SUBCHORIONIC HEMORRHAGE OF PLACENTA IN FIRST TRIMESTER, SINGLE OR UNSPECIFIED FETUS: Primary | ICD-10-CM

## 2019-02-02 LAB
APPEARANCE UR: CLEAR
BACTERIA URNS QL MICRO: NEGATIVE /HPF
BASOPHILS # BLD: 0 K/UL (ref 0–0.1)
BASOPHILS NFR BLD: 0 % (ref 0–1)
BILIRUB UR QL: NEGATIVE
COLOR UR: ABNORMAL
DIFFERENTIAL METHOD BLD: NORMAL
EOSINOPHIL # BLD: 0.1 K/UL (ref 0–0.4)
EOSINOPHIL NFR BLD: 1 % (ref 0–7)
EPITH CASTS URNS QL MICRO: ABNORMAL /LPF
ERYTHROCYTE [DISTWIDTH] IN BLOOD BY AUTOMATED COUNT: 11.9 % (ref 11.5–14.5)
GLUCOSE UR STRIP.AUTO-MCNC: NEGATIVE MG/DL
HCG SERPL-ACNC: ABNORMAL MIU/ML (ref 0–6)
HCT VFR BLD AUTO: 36.8 % (ref 35–47)
HGB BLD-MCNC: 12.9 G/DL (ref 11.5–16)
HGB UR QL STRIP: ABNORMAL
IMM GRANULOCYTES # BLD AUTO: 0 K/UL (ref 0–0.04)
IMM GRANULOCYTES NFR BLD AUTO: 0 % (ref 0–0.5)
KETONES UR QL STRIP.AUTO: NEGATIVE MG/DL
LEUKOCYTE ESTERASE UR QL STRIP.AUTO: NEGATIVE
LYMPHOCYTES # BLD: 2.2 K/UL (ref 0.8–3.5)
LYMPHOCYTES NFR BLD: 20 % (ref 12–49)
MCH RBC QN AUTO: 31.2 PG (ref 26–34)
MCHC RBC AUTO-ENTMCNC: 35.1 G/DL (ref 30–36.5)
MCV RBC AUTO: 89.1 FL (ref 80–99)
MONOCYTES # BLD: 0.7 K/UL (ref 0–1)
MONOCYTES NFR BLD: 6 % (ref 5–13)
NEUTS SEG # BLD: 7.9 K/UL (ref 1.8–8)
NEUTS SEG NFR BLD: 72 % (ref 32–75)
NITRITE UR QL STRIP.AUTO: NEGATIVE
NRBC # BLD: 0 K/UL (ref 0–0.01)
NRBC BLD-RTO: 0 PER 100 WBC
PH UR STRIP: 6 [PH] (ref 5–8)
PLATELET # BLD AUTO: 216 K/UL (ref 150–400)
PMV BLD AUTO: 11.9 FL (ref 8.9–12.9)
PROT UR STRIP-MCNC: NEGATIVE MG/DL
RBC # BLD AUTO: 4.13 M/UL (ref 3.8–5.2)
RBC #/AREA URNS HPF: ABNORMAL /HPF (ref 0–5)
SP GR UR REFRACTOMETRY: 1.01 (ref 1–1.03)
UR CULT HOLD, URHOLD: NORMAL
UROBILINOGEN UR QL STRIP.AUTO: 0.2 EU/DL (ref 0.2–1)
WBC # BLD AUTO: 10.9 K/UL (ref 3.6–11)
WBC URNS QL MICRO: ABNORMAL /HPF (ref 0–4)

## 2019-02-02 PROCEDURE — 36415 COLL VENOUS BLD VENIPUNCTURE: CPT

## 2019-02-02 PROCEDURE — 86900 BLOOD TYPING SEROLOGIC ABO: CPT

## 2019-02-02 PROCEDURE — 84702 CHORIONIC GONADOTROPIN TEST: CPT

## 2019-02-02 PROCEDURE — 76817 TRANSVAGINAL US OBSTETRIC: CPT

## 2019-02-02 PROCEDURE — 99283 EMERGENCY DEPT VISIT LOW MDM: CPT

## 2019-02-02 PROCEDURE — 85025 COMPLETE CBC W/AUTO DIFF WBC: CPT

## 2019-02-02 PROCEDURE — 81001 URINALYSIS AUTO W/SCOPE: CPT

## 2019-02-03 LAB
ABO + RH BLD: NORMAL
BLOOD BANK CMNT PATIENT-IMP: NORMAL

## 2019-02-03 NOTE — ED NOTES
Care assumed of patient @ this time & intro with rounding done, with report from ___Erlinda Lawrence RN________________. Patient resting quietly on stretcher without verbal complaints.

## 2019-02-03 NOTE — ED NOTES
Dr _____Termeer___________________ in to talk with patient and explain plan of care with  understanding and  written & verbal instructions.

## 2019-02-03 NOTE — ED PROVIDER NOTES
EMERGENCY DEPARTMENT HISTORY AND PHYSICAL EXAM 
 
 
Date: 2019 Patient Name: Catalino Hart History of Presenting Illness Chief Complaint Patient presents with  Pregnancy Problem 8 weeks pregnant & having bleeding & clots since 9 pm   
 
 
History Provided By: Patient HPI: Catalino Hart, 33 y.o. female that is currently 8 weeks pregnant () with no significant PMHx, presents ambulatory to the ED with cc of vaginal bleeding since 9 pm today. Patient reports she had passed a 2 inch clot and has had light spotting since. She reports she had a US performed 9 days ago that showed her placenta and fetal heart rate but was told that imaging had showed some subchorionic hemorrhage. She denies abdominal pain, vomiting, diarrhea, or dysuria. There are no other complaints, changes, or physical findings at this time. PCP: Rafal Dewitt MD  
OBGYN: Dr. Sammie Guajardo No current facility-administered medications on file prior to encounter. Current Outpatient Medications on File Prior to Encounter Medication Sig Dispense Refill  PNV38-Iron Cbn&Gluc-FA-DSS-DHA 35-1- mg cmpk Take  by mouth. Past History Past Medical History: No past medical history on file. Past Surgical History: 
Past Surgical History:  
Procedure Laterality Date  HX ORTHOPAEDIC  2006 Nasal surgery  HX WISDOM TEETH EXTRACTION Bilateral  Family History: 
Family History Problem Relation Age of Onset  No Known Problems Mother  No Known Problems Father  No Known Problems Sister  No Known Problems Brother  Stroke Paternal Grandmother Social History: 
Social History Tobacco Use  Smoking status: Never Smoker  Smokeless tobacco: Never Used Substance Use Topics  Alcohol use: Yes Comment: occasionally  Drug use: No  
 
 
Allergies: 
No Known Allergies Review of Systems Review of Systems Constitutional: Negative for activity change, appetite change, chills, fever and unexpected weight change. HENT: Negative for congestion. Eyes: Negative for pain and visual disturbance. Respiratory: Negative for cough and shortness of breath. Cardiovascular: Negative for chest pain. Gastrointestinal: Negative for abdominal pain, diarrhea, nausea and vomiting. Genitourinary: Positive for vaginal bleeding. Negative for dysuria. Musculoskeletal: Negative for back pain. Skin: Negative for rash. Neurological: Negative for headaches. Physical Exam  
Physical Exam  
Constitutional: She is oriented to person, place, and time. She appears well-developed and well-nourished. No distress. Well appearing young female HENT:  
Head: Normocephalic and atraumatic. Mouth/Throat: Oropharynx is clear and moist.  
Eyes: Conjunctivae and EOM are normal. Pupils are equal, round, and reactive to light. Right eye exhibits no discharge. Left eye exhibits no discharge. Neck: Normal range of motion. Neck supple. Cardiovascular: Normal rate, regular rhythm and normal heart sounds. No murmur heard. Pulmonary/Chest: Effort normal and breath sounds normal. No respiratory distress. She has no wheezes. She has no rales. Abdominal: Soft. Bowel sounds are normal. She exhibits no distension. There is no tenderness. Musculoskeletal: Normal range of motion. She exhibits no edema. Neurological: She is alert and oriented to person, place, and time. No cranial nerve deficit. She exhibits normal muscle tone. Skin: Skin is warm and dry. No rash noted. She is not diaphoretic. Nursing note and vitals reviewed. Diagnostic Study Results Labs - Recent Results (from the past 12 hour(s)) BETA HCG, QT Collection Time: 02/02/19 10:39 PM  
Result Value Ref Range Beta HCG, ,547 (H) 0 - 6 MIU/ML  
CBC WITH AUTOMATED DIFF Collection Time: 02/02/19 10:39 PM  
Result Value Ref Range WBC 10.9 3.6 - 11.0 K/uL  
 RBC 4.13 3.80 - 5.20 M/uL  
 HGB 12.9 11.5 - 16.0 g/dL HCT 36.8 35.0 - 47.0 % MCV 89.1 80.0 - 99.0 FL  
 MCH 31.2 26.0 - 34.0 PG  
 MCHC 35.1 30.0 - 36.5 g/dL  
 RDW 11.9 11.5 - 14.5 % PLATELET 924 585 - 277 K/uL MPV 11.9 8.9 - 12.9 FL  
 NRBC 0.0 0  WBC ABSOLUTE NRBC 0.00 0.00 - 0.01 K/uL NEUTROPHILS 72 32 - 75 % LYMPHOCYTES 20 12 - 49 % MONOCYTES 6 5 - 13 % EOSINOPHILS 1 0 - 7 % BASOPHILS 0 0 - 1 % IMMATURE GRANULOCYTES 0 0.0 - 0.5 % ABS. NEUTROPHILS 7.9 1.8 - 8.0 K/UL  
 ABS. LYMPHOCYTES 2.2 0.8 - 3.5 K/UL  
 ABS. MONOCYTES 0.7 0.0 - 1.0 K/UL  
 ABS. EOSINOPHILS 0.1 0.0 - 0.4 K/UL  
 ABS. BASOPHILS 0.0 0.0 - 0.1 K/UL  
 ABS. IMM. GRANS. 0.0 0.00 - 0.04 K/UL  
 DF AUTOMATED    
BLOOD TYPE, (ABO+RH) Collection Time: 02/02/19 10:39 PM  
Result Value Ref Range ABO/Rh(D) A POSITIVE Comment SAMPLE NOT USABLE FOR CROSSMATCH   
URINALYSIS W/MICROSCOPIC Collection Time: 02/02/19 10:39 PM  
Result Value Ref Range Color YELLOW/STRAW Appearance CLEAR CLEAR Specific gravity 1.015 1.003 - 1.030    
 pH (UA) 6.0 5.0 - 8.0 Protein NEGATIVE  NEG mg/dL Glucose NEGATIVE  NEG mg/dL Ketone NEGATIVE  NEG mg/dL Bilirubin NEGATIVE  NEG Blood LARGE (A) NEG Urobilinogen 0.2 0.2 - 1.0 EU/dL Nitrites NEGATIVE  NEG Leukocyte Esterase NEGATIVE  NEG    
 WBC 0-4 0 - 4 /hpf  
 RBC 10-20 0 - 5 /hpf Epithelial cells FEW FEW /lpf Bacteria NEGATIVE  NEG /hpf URINE CULTURE HOLD SAMPLE Collection Time: 02/02/19 10:39 PM  
Result Value Ref Range Urine culture hold URINE ON HOLD IN MICROBIOLOGY DEPT FOR 3 DAYS. IF UNPRESERVED URINE IS SUBMITTED, IT CANNOT BE USED FOR ADDITIONAL TESTING AFTER 24 HRS, RECOLLECTION WILL BE REQUIRED. Radiologic Studies -  
US UTS TRANSVAGINAL OB Final Result IMPRESSION: Single live 8 week 2 day intrauterine pregnancy. Subchorionic  
hemorrhage. EXAM:  OB TRANSVAGINAL US  
INDICATION: 8 week with known [address], now with VB.  LMP: Not given. COMPARISON: None. TECHNIQUE: 
Transvaginal sonography was performed with multiple static images of the uterus 
and ovariesobtained. FINDINGS: 
UTERUS: 
The uterus measures 10.2 x 7.7 x 6.6 cm The examination demonstrates a single 
intrauterine pregnancy with a crown-rump length of 1.76 cm and estimated 
gestational age of 8 weeks 2 days. Fetal cardiac activity is identified with a 
fetal heart rate of 167 beats per minute. The gestation is too early to permit 
evaluation of the placental anatomic structure and assessment of qualitative 
amniotic fluid volume. The cervix is closed. There is some hypoechoic 
subchorionic hemorrhage. OVARIES: 
The ovaries are normal in appearance.  The right ovary measures 2.8 x 2.9 x 2.1 
cm and the left ovary measures 3.7 x 3.7 x 2.3 cm. There is normal color flow to 
the ovaries. There is a left ovarian corpus luteum cyst which measures 2.6 x 2.4 
x 2.3 cm. CT Results  (Last 48 hours) None CXR Results  (Last 48 hours) None Medical Decision Making I am the first provider for this patient. I reviewed the vital signs, available nursing notes, past medical history, past surgical history, family history and social history. Vital Signs-Reviewed the patient's vital signs. Patient Vitals for the past 12 hrs: 
 Temp Pulse Resp BP SpO2  
02/02/19 2220 97.8 °F (36.6 °C) 61 18 142/87 100 % Pulse Oximetry Analysis - 100% on RA Cardiac Monitor:  
Rate: 61 bpm 
Rhythm: Normal Sinus Rhythm Records Reviewed: Nursing Notes and Old Medical Records Provider Notes (Medical Decision Making):  
Patient with known subchorionic hemorrhage presents with painless bleeding. Currently hemodynamically stable with benign exam.  
 
ED Course:  
Initial assessment performed.  The patients presenting problems have been discussed, and they are in agreement with the care plan formulated and outlined with them. I have encouraged them to ask questions as they arise throughout their visit. 23:55 Results reviewed with patient and her spouse. Pelvic rest recommendations given. Pt to follow up with OB Monday. Critical Care Time:  
0 minutes Disposition: 
DISCHARGE NOTE: 
11:49 PM 
The patient is ready for discharge. The patients signs, symptoms, diagnosis, and instructions for discharge have been discussed and the pt has conveyed their understanding. The patient is to follow up as recommended or return to the ER should their symptoms worsen. Plan has been discussed and patient has conveyed their agreement. PLAN: 
1. Current Discharge Medication List  
  
 
2. Follow-up Information Follow up With Specialties Details Why Contact Kamran Gregorio DO Obstetrics & Gynecology, Obstetrics, Gynecology Schedule an appointment as soon as possible for a visit for recheck [address] [place] [address] [address] 1400 [address] [place] [address] 
[phone] hospitals EMERGENCY DEPT Emergency Medicine  If symptoms worsen [address] [facility] 
[phone] Return to ED if worse Diagnosis Clinical Impression: 1. Subchorionic hemorrhage of placenta in first trimester, single or unspecified fetus Attestations: This note is prepared by Altagracia Novoa, acting as Scribe for MD Moses Nassar MD: The scribe's documentation has been prepared under my direction and personally reviewed by me in its entirety. I confirm that the note above accurately reflects all work, treatment, procedures, and medical decision making performed by me. This note will not be viewable in [address]

## 2019-02-03 NOTE — ED NOTES
Patient presents to ED with complaint with complaint of vaginal bleeding since around 2100. Patient stated she is about 8 weeks pregnant. Patient stated placenta was seen on US and that they saw a hemorrhage. Patient stated she recently had a miscarriage. Patient denied cramping, stated she passed a 2 in clot. Patient is alert and responding appropriately.

## 2019-02-03 NOTE — DISCHARGE INSTRUCTIONS
Patient Education        Subchorionic Hematoma: Care Instructions  Your Care Instructions    A subchorionic hematoma or hemorrhage is bleeding under one of the membranes (chorion) that surrounds the embryo inside the uterus. It is a common cause of bleeding in early pregnancy. The main symptom is vaginal bleeding. But some women don't have symptoms. They may find out they have a hematoma during an ultrasound test.  In most cases, the bleeding goes away on its own. Most women go on to have a healthy baby. But in some cases, the bleeding is a sign of a miscarriage or other problem with the pregnancy. Your doctor may want to do a follow-up ultrasound. Follow-up care is a key part of your treatment and safety. Be sure to make and go to all appointments, and call your doctor if you are having problems. It's also a good idea to know your test results and keep a list of the medicines you take. How can you care for yourself at home? · Keep track of any bleeding, and follow the guidelines for when to call your doctor. · Keep in mind that some bleeding during the first trimester or an abnormal finding on an ultrasound may:  ? Not cause any problems for you or the baby. ? Turn out to be something more serious. But if this happens, it's best to find out early. Then you and your doctor can manage any complications sooner rather than later. When should you call for help? Call 911 anytime you think you may need emergency care. For example, call if:    · You have sudden, severe pain in your belly or pelvis.     · You passed out (lost consciousness).     · You have severe vaginal bleeding.    Call your doctor now or seek immediate medical care if:    · You are dizzy or lightheaded, or you feel like you may faint.     · You have new or increased pain in your belly or pelvis.     · You have new or more vaginal bleeding.     · You have pain in the vaginal area.     · You have a fever.     · You think you may have passed tissue. Save any tissue that you pass. Take it to your doctor's office as soon as you can.    Watch closely for changes in your health, and be sure to contact your doctor if:    · You have new or worse vaginal symptoms, such as pain, itching, or a discharge.     · You do not get better as expected. Where can you learn more? Go to http://lauren-brent.info/. Yuliya Coy in the search box to learn more about \"Subchorionic Hematoma: Care Instructions. \"  Current as of: September 5, 2018  Content Version: 11.9  © 1982-1102 [employer]. Care instructions adapted under license by [employer] (which disclaims liability or warranty for this information). If you have questions about a medical condition or this instruction, always ask your healthcare professional. [address] any warranty or liability for your use of this information.

## 2020-05-27 ENCOUNTER — VIRTUAL VISIT (OUTPATIENT)
Dept: FAMILY MEDICINE CLINIC | Age: 30
End: 2020-05-27

## 2020-05-27 VITALS — BODY MASS INDEX: 21.49 KG/M2 | WEIGHT: 129 LBS | HEIGHT: 65 IN
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
## 2020-05-27 DIAGNOSIS — R53.83 FATIGUE, UNSPECIFIED TYPE: ICD-10-CM

## 2020-05-27 RX ORDER — AMOXICILLIN 500 MG/1
500 CAPSULE ORAL 3 TIMES DAILY
Qty: 30 CAP | Refills: 0 | Status: SHIPPED | OUTPATIENT
Start: 2020-05-27 | End: 2020-06-06

## 2020-05-27 NOTE — PROGRESS NOTES
Chief Complaint   Patient presents with    Sore Throat    Fatigue    Headache    Insect Bite     tick bite      Health Maintenance reviewed     1. Have you been to the ER, urgent care clinic since your last visit? Hospitalized since your last visit? Yes, on file     2. Have you seen or consulted any other health care providers outside of the [address] [place] [address] since your last visit? Include any pap smears or colon screening.   No

## 2020-05-27 NOTE — PROGRESS NOTES
Chief Complaint   Patient presents with    Sore Throat    Fatigue    Headache    Insect Bite     tick bite      [facility]. Pt reports that she has been feeling tired, sore throat. Some headache. Pt and her  did go to be tested for COVID today at [facility], since her  has been continuing to work. His job wanted him to be certain. Pt reports that she did remove two ticks last week and her mother has had lyme disease, pt was concerned about possible tick illness given her current symptoms. No redness developed at either bite, localized redness and itching. Temperature was 99.? No known sick contacts. Pt has an 7 month old. Steve Caldwell is a 30 y.o. female who was seen by synchronous (real-time) audio-video technology on 5/27/2020. Consent: Steve Caldwell, who was seen by synchronous (real-time) audio-video technology, and/or her healthcare decision maker, is aware that this patient-initiated, Telehealth encounter on 5/27/2020 is a billable service, with coverage as determined by her insurance carrier. She is aware that she may receive a bill and has provided verbal consent to proceed: Yes. Assessment & Plan:   1. Tick bite, initial encounter  -take amoxicillin as written, pt is still breastfeeding    2. Fatigue, unspecified type  -COVID test pending      Subjective:   Steve Caldwell is a 30 y.o. female who was seen for Sore Throat; Fatigue; Headache; and Insect Bite (tick bite )      Prior to Admission medications    Medication Sig Start Date End Date Taking? Authorizing Provider   amoxicillin (AMOXIL) 500 mg capsule Take 1 Cap by mouth three (3) times daily for 10 days. 5/27/20 6/6/20 Yes Florin Prince MD   [id number]-Kzfe Cbn&Gluc-FA-DSS-DHA 35-1- mg cmpk Take  by mouth. Yes Provider, Historical     No Known Allergies    There is no problem list on file for this patient.       Review of Systems   Constitutional: Positive for fever and malaise/fatigue. HENT: Negative for congestion. Eyes: Negative for blurred vision and double vision. Respiratory: Negative for cough. Cardiovascular: Negative for chest pain and palpitations. Gastrointestinal: Negative for heartburn, nausea and vomiting. Genitourinary: Negative for dysuria. Musculoskeletal: Positive for myalgias. Negative for back pain and joint pain. Skin: Negative for rash. Neurological: Negative for dizziness, tingling and headaches.        Objective:   Vital Signs: (As obtained by patient/caregiver at home)  Visit Vitals  Ht 5' 5\" (1.651 m)   Wt 129 lb (58.5 kg)   BMI 21.47 kg/m²        [INSTRUCTIONS:  \"[x]\" Indicates a positive item  \"[]\" Indicates a negative item  -- DELETE ALL ITEMS NOT EXAMINED]    Constitutional: [x] Appears well-developed and well-nourished [x] No apparent distress      [] Abnormal -     Mental status: [x] Alert and awake  [x] Oriented to person/place/time [x] Able to follow commands    [] Abnormal -     Eyes:   EOM    [x]  Normal    [] Abnormal -   Sclera  [x]  Normal    [] Abnormal -          Discharge [x]  None visible   [] Abnormal -     HENT: [x] Normocephalic, atraumatic  [] Abnormal -   [x] Mouth/Throat: Mucous membranes are moist    External Ears [x] Normal  [] Abnormal -    Neck: [x] No visualized mass [] Abnormal -     Pulmonary/Chest: [x] Respiratory effort normal   [x] No visualized signs of difficulty breathing or respiratory distress        [] Abnormal -      Musculoskeletal:   [x] Normal gait with no signs of ataxia         [x] Normal range of motion of neck        [] Abnormal -     Neurological:        [x] No Facial Asymmetry (Cranial nerve 7 motor function) (limited exam due to video visit)          [x] No gaze palsy        [] Abnormal -          Skin:        [x] No significant exanthematous lesions or discoloration noted on facial skin         [] Abnormal -            Psychiatric:       [x] Normal Affect [] Abnormal - [x] No Hallucinations    Other pertinent observable physical exam findings:-        We discussed the expected course, resolution and complications of the diagnosis(es) in detail. Medication risks, benefits, costs, interactions, and alternatives were discussed as indicated. I advised her to contact the office if her condition worsens, changes or fails to improve as anticipated. She expressed understanding with the diagnosis(es) and plan. Brant Thurston is a 30 y.o. female who was evaluated by a video visit encounter for concerns as above. Patient identification was verified prior to start of the visit. A caregiver was present when appropriate. Due to this being a TeleHealth encounter (During YMUYJ-81 public health emergency), evaluation of the following organ systems was limited: Vitals/Constitutional/EENT/Resp/CV/GI//MS/Neuro/Skin/Heme-Lymph-Imm. Pursuant to the emergency declaration under the [address] [place] [address], [facility] waiver authority and the [employer] Act, this Virtual  Visit was conducted, with patient's (and/or legal guardian's) consent, to reduce the patient's risk of exposure to COVID-19 and provide necessary medical care. Services were provided through a video synchronous discussion virtually to substitute for in-person clinic visit. Patient and provider were located at their individual homes.       Renato Marcos MD

## 2020-05-29 ENCOUNTER — VIRTUAL VISIT (OUTPATIENT)
Dept: FAMILY MEDICINE CLINIC | Age: 30
End: 2020-05-29

## 2020-05-29 VITALS — BODY MASS INDEX: 21.49 KG/M2 | WEIGHT: 129 LBS | HEIGHT: 65 IN

## 2020-05-29 DIAGNOSIS — S29.012A UPPER BACK STRAIN, INITIAL ENCOUNTER: Primary | ICD-10-CM

## 2020-05-29 RX ORDER — CYCLOBENZAPRINE HCL 5 MG
5 TABLET ORAL
Qty: 30 TAB | Refills: 0 | Status: SHIPPED | OUTPATIENT
Start: 2020-05-29 | End: 2020-06-22

## 2020-05-29 NOTE — PROGRESS NOTES
Chief Complaint   Patient presents with    Back Pain     upper back     Neck Pain     1. Have you been to the ER, urgent care clinic since your last visit? Hospitalized since your last visit? No    2. Have you seen or consulted any other health care providers outside of the [address] [place] [address] since your last visit? Include any pap smears or colon screening. No    Health maintenance reviewed. Pt informed of health maintenance past due and/or upcoming. Pt verbalized understanding.      Health Maintenance Due   Topic Date Due    PAP AKA CERVICAL CYTOLOGY  07/02/2019

## 2020-05-29 NOTE — PROGRESS NOTES
Chief Complaint   Patient presents with    Back Pain     upper back     Neck Pain     Pt was seen via [employer] video platform. Pt reports that she woke up feeling fine, but then developed neck pain after she started moving around. Pt reports that she has had similar episodes of the same in the past, normally happens 2-3 times per year. Normally goes away within 24 hours. Not moving helps the pain the most.     Heat/cold provide minimal relief. Pt has taken ibuprofen. Claudia Salvador is a 30 y.o. female who was seen by synchronous (real-time) audio-video technology on 5/29/2020. Consent: Claudia Salvador, who was seen by synchronous (real-time) audio-video technology, and/or her healthcare decision maker, is aware that this patient-initiated, Telehealth encounter on 5/29/2020 is a billable service, with coverage as determined by her insurance carrier. She is aware that she may receive a bill and has provided verbal consent to proceed: Yes. Assessment & Plan:   1. Upper back strain, initial encounter  -advised to take medication BID PRN, rest, gentle stretching   - cyclobenzaprine (FLEXERIL) 5 mg tablet; Take 1 Tab by mouth two (2) times daily as needed for Muscle Spasm(s). Dispense: 30 Tab; Refill: 0      Subjective:   Claudia Salvador is a 30 y.o. female who was seen for Back Pain (upper back ) and Neck Pain      Prior to Admission medications    Medication Sig Start Date End Date Taking? Authorizing Provider   cyclobenzaprine (FLEXERIL) 5 mg tablet Take 1 Tab by mouth two (2) times daily as needed for Muscle Spasm(s). 5/29/20  Yes Claire Prince MD   amoxicillin (AMOXIL) 500 mg capsule Take 1 Cap by mouth three (3) times daily for 10 days. 5/27/20 6/6/20 Yes Claire Prince MD   [id number] Cbn&Gluc-FA-DSS-DHA 35-1- mg cmpk Take  by mouth. Yes Provider, Historical     No Known Allergies    There is no problem list on file for this patient.       Review of Systems Constitutional: Negative for chills, fever, malaise/fatigue and weight loss. Respiratory: Negative for cough. Gastrointestinal: Negative for heartburn, nausea and vomiting. Musculoskeletal: Positive for back pain, myalgias and neck pain. All other systems reviewed and are negative.       Objective:   Vital Signs: (As obtained by patient/caregiver at home)  Visit Vitals  Ht 5' 5\" (1.651 m)   Wt 129 lb (58.5 kg)   Breastfeeding Yes   BMI 21.47 kg/m²        [INSTRUCTIONS:  \"[x]\" Indicates a positive item  \"[]\" Indicates a negative item  -- DELETE ALL ITEMS NOT EXAMINED]    Constitutional: [x] Appears well-developed and well-nourished [x] No apparent distress      [] Abnormal -     Mental status: [x] Alert and awake  [x] Oriented to person/place/time [x] Able to follow commands    [] Abnormal -     Eyes:   EOM    [x]  Normal    [] Abnormal -   Sclera  [x]  Normal    [] Abnormal -          Discharge [x]  None visible   [] Abnormal -     HENT: [x] Normocephalic, atraumatic  [] Abnormal -   [x] Mouth/Throat: Mucous membranes are moist    External Ears [x] Normal  [] Abnormal -    Neck: [x] No visualized mass [] Abnormal -     Pulmonary/Chest: [x] Respiratory effort normal   [x] No visualized signs of difficulty breathing or respiratory distress        [] Abnormal -      Musculoskeletal:   [x] Normal gait with no signs of ataxia         [x] Normal range of motion of neck        [] Abnormal -     Neurological:        [x] No Facial Asymmetry (Cranial nerve 7 motor function) (limited exam due to video visit)          [x] No gaze palsy        [] Abnormal -          Skin:        [x] No significant exanthematous lesions or discoloration noted on facial skin         [] Abnormal -            Psychiatric:       [x] Normal Affect [] Abnormal -        [x] No Hallucinations    Other pertinent observable physical exam findings:-        We discussed the expected course, resolution and complications of the diagnosis(es) in detail. Medication risks, benefits, costs, interactions, and alternatives were discussed as indicated. I advised her to contact the office if her condition worsens, changes or fails to improve as anticipated. She expressed understanding with the diagnosis(es) and plan. Alexander Russell is a 30 y.o. female who was evaluated by a video visit encounter for concerns as above. Patient identification was verified prior to start of the visit. A caregiver was present when appropriate. Due to this being a TeleHealth encounter (During FIQVG-44 public health emergency), evaluation of the following organ systems was limited: Vitals/Constitutional/EENT/Resp/CV/GI//MS/Neuro/Skin/Heme-Lymph-Imm. Pursuant to the emergency declaration under the [facility], [facility] waiver authority and the [employer] and [employer] Act, this Virtual  Visit was conducted, with patient's (and/or legal guardian's) consent, to reduce the patient's risk of exposure to COVID-19 and provide necessary medical care. Services were provided through a video synchronous discussion virtually to substitute for in-person clinic visit. Patient and provider were located at their individual homes.       Bogdan Espinal MD

## 2020-06-20 DIAGNOSIS — S29.012A UPPER BACK STRAIN, INITIAL ENCOUNTER: ICD-10-CM

## 2020-06-22 RX ORDER — CYCLOBENZAPRINE HCL 5 MG
TABLET ORAL
Qty: 30 TAB | Refills: 0 | Status: SHIPPED | OUTPATIENT
Start: 2020-06-22 | End: 2020-07-06

## 2020-09-21 ENCOUNTER — VIRTUAL VISIT (OUTPATIENT)
Dept: FAMILY MEDICINE CLINIC | Age: 30
End: 2020-09-21
Payer: COMMERCIAL

## 2020-09-21 DIAGNOSIS — B35.9 RINGWORM: Primary | ICD-10-CM

## 2020-09-21 PROCEDURE — 99213 OFFICE O/P EST LOW 20 MIN: CPT | Performed by: FAMILY MEDICINE

## 2020-09-21 NOTE — PROGRESS NOTES
Chief Complaint   Patient presents with    Rash     on face      Health Maintenance reviewed     Health Maintenance reviewed     1. Have you been to the ER, urgent care clinic since your last visit? Hospitalized since your last visit? No     2. Have you seen or consulted any other health care providers outside of the [address] [place] [address] since your last visit? Include any pap smears or colon screening.   No

## 2020-09-26 NOTE — PROGRESS NOTES
Chief Complaint   Patient presents with    Rash     on face      Patient was seen today via virtual video visit. Patient reports that she has noted a small red circular rash on the right cheek times just more than a week. Patient reports that rash is dry and slightly irritated. Patient is concerned that it could be coming from her mask as it is in approximately the same area. Laura Forbes is a 35 y.o. female who was seen by synchronous (real-time) audio-video technology on 9/21/2020 for Rash (on face )        Assessment & Plan:   Diagnoses and all orders for this visit:    1. Ringworm    Advised patient to use over-the-counter athlete's foot cream, clotrimazole twice daily. Advised patient to keep area uncovered    Subjective:       Prior to Admission medications    Medication Sig Start Date End Date Taking? Authorizing Provider   PNV38-Iron Cbn&Gluc-FA-DSS-DHA 35-1- mg cmpk Take  by mouth. Yes Provider, Historical   cyclobenzaprine (FLEXERIL) 5 mg tablet TAKE 1 TABLET BY MOUTH 2 TIMES A DAY AS NEEDED FOR MUSCLE SPASMS 7/6/20   Teodoro Prince MD     There is no problem list on file for this patient. Review of Systems   Constitutional: Negative for chills, fever and malaise/fatigue. HENT: Negative for congestion and sore throat. Respiratory: Negative for cough and shortness of breath. Cardiovascular: Negative for chest pain and palpitations. Gastrointestinal: Negative for abdominal pain, heartburn, nausea and vomiting. Genitourinary: Negative for dysuria and urgency. Musculoskeletal: Negative for joint pain and myalgias. Skin: Positive for rash. Neurological: Negative for dizziness, tingling and headaches. All other systems reviewed and are negative.       Objective:     Patient-Reported Vitals 9/21/2020   Patient-Reported Weight 127 lbs        [INSTRUCTIONS:  \"[x]\" Indicates a positive item  \"[]\" Indicates a negative item  -- DELETE ALL ITEMS NOT EXAMINED]    Constitutional: [x] Appears well-developed and well-nourished [x] No apparent distress      [] Abnormal -     Mental status: [x] Alert and awake  [x] Oriented to person/place/time [x] Able to follow commands    [] Abnormal -     Eyes:   EOM    [x]  Normal    [] Abnormal -   Sclera  [x]  Normal    [] Abnormal -          Discharge [x]  None visible   [] Abnormal -     HENT: [x] Normocephalic, atraumatic  [] Abnormal -   [x] Mouth/Throat: Mucous membranes are moist    External Ears [x] Normal  [] Abnormal -    Neck: [x] No visualized mass [] Abnormal -     Pulmonary/Chest: [x] Respiratory effort normal   [x] No visualized signs of difficulty breathing or respiratory distress        [] Abnormal -      Musculoskeletal:   [x] Normal gait with no signs of ataxia         [x] Normal range of motion of neck        [] Abnormal -     Neurological:        [x] No Facial Asymmetry (Cranial nerve 7 motor function) (limited exam due to video visit)          [x] No gaze palsy        [] Abnormal -          Skin:        [x] No significant exanthematous lesions or discoloration noted on facial skin         [] Abnormal -            Psychiatric:       [x] Normal Affect [] Abnormal -        [x] No Hallucinations    Other pertinent observable physical exam findings:-        We discussed the expected course, resolution and complications of the diagnosis(es) in detail. Medication risks, benefits, costs, interactions, and alternatives were discussed as indicated. I advised her to contact the office if her condition worsens, changes or fails to improve as anticipated. She expressed understanding with the diagnosis(es) and plan. [facility], who was evaluated through a patient-initiated, synchronous (real-time) audio-video encounter, and/or her healthcare decision maker, is aware that it is a billable service, with coverage as determined by her insurance carrier.  She provided verbal consent to proceed: Yes, and patient identification was verified. It was conducted pursuant to the emergency declaration under the [facility], [address] [place] [address] and the [place] [employer] and [employer] General Act. A caregiver was present when appropriate. Ability to conduct physical exam was limited. I was in the office. The patient was at home.       Sidra Opitz, MD

## 2020-09-28 ENCOUNTER — TELEPHONE (OUTPATIENT)
Dept: FAMILY MEDICINE CLINIC | Age: 30
End: 2020-09-28

## 2020-09-28 NOTE — TELEPHONE ENCOUNTER
Patient asking for note stating that it is ok not to wear face mask or if she needs to,  only to wear face shield. [employer] activation code sent to email of patient, she says that this letter can be uploaded to her [facility] [employer]. If anything she will come pick this up if there is a problem.  Patient can be reached at [phone]

## 2020-09-29 NOTE — TELEPHONE ENCOUNTER
Pt states she had VV on 9/21 and the rash on her face is not not getting any better she states its a fungal infection from her mask that's the only reason she asking for letter

## 2020-10-01 ENCOUNTER — VIRTUAL VISIT (OUTPATIENT)
Dept: FAMILY MEDICINE CLINIC | Age: 30
End: 2020-10-01
Payer: COMMERCIAL

## 2020-10-01 DIAGNOSIS — B35.9 RINGWORM: ICD-10-CM

## 2020-10-01 PROCEDURE — 99213 OFFICE O/P EST LOW 20 MIN: CPT | Performed by: FAMILY MEDICINE

## 2020-10-01 NOTE — PROGRESS NOTES
Chief Complaint   Patient presents with    Rash     on face      Health Maintenance reviewed     1. Have you been to the ER, urgent care clinic since your last visit? Hospitalized since your last visit? No     2. Have you seen or consulted any other health care providers outside of the [address] [place] [address] since your last visit? Include any pap smears or colon screening.   No

## 2020-10-01 NOTE — PROGRESS NOTES
Chief Complaint   Patient presents with    Rash     on face      Pt was seen via video visit. Pt reports that she has had continued rash on her face. Pt reports that it has now been spreading along her lower jaw line. Pt is a teacher at [employer]. Pt has been trying to wear her mask as little as possible. Pt initially wanted a letter to prevent her from wearing a mask, however she will not be able to go to school without a mask on. Pt is still breast feeding, is not able to take oral medication at this time. January Aguillon is a 35 y.o. female who was seen by synchronous (real-time) audio-video technology on 10/1/2020 for Rash (on face )        Assessment & Plan:   Diagnoses and all orders for this visit:    1. Mother currently breast-feeding  - plans to work on weaning with hope to change to oral medication    2. Ringworm-continue on topical medication, approved leave of absence due to facial rash        Subjective:       Prior to Admission medications    Medication Sig Start Date End Date Taking? Authorizing Provider   cyclobenzaprine (FLEXERIL) 5 mg tablet TAKE 1 TABLET BY MOUTH 2 TIMES A DAY AS NEEDED FOR MUSCLE SPASMS 7/6/20  Yes Miriam Prince MD   SFH66-Utcg Cbn&Gluc-FA-DSS-DHA 35-1- mg cmpk Take  by mouth. Yes Provider, Historical     There is no problem list on file for this patient. Review of Systems   Constitutional: Negative for chills, fever and malaise/fatigue. HENT: Negative for congestion and sore throat. Respiratory: Negative for cough and shortness of breath. Cardiovascular: Negative for chest pain and palpitations. Gastrointestinal: Negative for abdominal pain, heartburn, nausea and vomiting. Genitourinary: Negative for dysuria and urgency. Musculoskeletal: Negative for joint pain and myalgias. Skin: Positive for rash. Neurological: Negative for dizziness, tingling and headaches.    All other systems reviewed and are negative. Objective:     Patient-Reported Vitals 10/1/2020   Patient-Reported Weight 127  lbs        [INSTRUCTIONS:  \"[x]\" Indicates a positive item  \"[]\" Indicates a negative item  -- DELETE ALL ITEMS NOT EXAMINED]    Constitutional: [x] Appears well-developed and well-nourished [x] No apparent distress      [] Abnormal -     Mental status: [x] Alert and awake  [x] Oriented to person/place/time [x] Able to follow commands    [] Abnormal -     Eyes:   EOM    [x]  Normal    [] Abnormal -   Sclera  [x]  Normal    [] Abnormal -          Discharge [x]  None visible   [] Abnormal -     HENT: [x] Normocephalic, atraumatic  [] Abnormal -   [x] Mouth/Throat: Mucous membranes are moist    External Ears [x] Normal  [] Abnormal -    Neck: [x] No visualized mass [] Abnormal -     Pulmonary/Chest: [x] Respiratory effort normal   [x] No visualized signs of difficulty breathing or respiratory distress        [] Abnormal -      Musculoskeletal:   [x] Normal gait with no signs of ataxia         [x] Normal range of motion of neck        [] Abnormal -     Neurological:        [x] No Facial Asymmetry (Cranial nerve 7 motor function) (limited exam due to video visit)          [x] No gaze palsy        [] Abnormal -          Skin:        [] No significant exanthematous lesions or discoloration noted on facial skin         [x] Abnormal - annular lesions on the right cheek x 2, lesion at left corner of her lip           Psychiatric:       [x] Normal Affect [] Abnormal -        [x] No Hallucinations    Other pertinent observable physical exam findings:-        We discussed the expected course, resolution and complications of the diagnosis(es) in detail. Medication risks, benefits, costs, interactions, and alternatives were discussed as indicated. I advised her to contact the office if her condition worsens, changes or fails to improve as anticipated. She expressed understanding with the diagnosis(es) and plan.        Shannon, who was evaluated through a patient-initiated, synchronous (real-time) audio-video encounter, and/or her healthcare decision maker, is aware that it is a billable service, with coverage as determined by her insurance carrier. She provided verbal consent to proceed: Yes, and patient identification was verified. It was conducted pursuant to the emergency declaration under the [address] [place] [address] and the [place] [employer] and [employer] General Act. A caregiver was present when appropriate. Ability to conduct physical exam was limited. I was in the office. The patient was at home.       Óscar Champion MD

## 2020-10-05 ENCOUNTER — TELEPHONE (OUTPATIENT)
Dept: FAMILY MEDICINE CLINIC | Age: 30
End: 2020-10-05

## 2020-10-05 NOTE — TELEPHONE ENCOUNTER
Pt is calling again wanting to see if the med she requested can be sent to the pharmacy so she can  today

## 2020-10-05 NOTE — TELEPHONE ENCOUNTER
Patient calling to speak with Dr Prince about MyChart messages sent yesterday.  She can be reached at [phone]

## 2020-10-06 RX ORDER — TERBINAFINE HYDROCHLORIDE 250 MG/1
250 TABLET ORAL DAILY
Qty: 90 TAB | Refills: 0 | Status: SHIPPED | OUTPATIENT
Start: 2020-10-06 | End: 2020-10-07 | Stop reason: SDUPTHER

## 2020-10-06 RX ORDER — TERBINAFINE HYDROCHLORIDE 250 MG/1
250 TABLET ORAL DAILY
Qty: 90 TAB | Refills: 0 | Status: SHIPPED | OUTPATIENT
Start: 2020-10-06 | End: 2020-10-06 | Stop reason: SDUPTHER

## 2020-10-06 NOTE — TELEPHONE ENCOUNTER
Pt is calling stating ins will not take care of med needs a prior auth or alternative she wants to change pharmacy as well I did change that        Requested Prescriptions     Pending Prescriptions Disp Refills    terbinafine HCL (LAMISIL) 250 mg tablet 90 Tab 0     Sig: Take 1 Tab by mouth daily.

## 2020-10-06 NOTE — TELEPHONE ENCOUNTER
Pt notified of med. She voiced understanding. Pt had questions regarding FMLA and wants to know if these are completed. She would like a call when completed.

## 2020-10-07 ENCOUNTER — TELEPHONE (OUTPATIENT)
Dept: FAMILY MEDICINE CLINIC | Age: 30
End: 2020-10-07

## 2020-10-07 RX ORDER — TERBINAFINE HYDROCHLORIDE 250 MG/1
250 TABLET ORAL DAILY
Qty: 90 TAB | Refills: 0 | Status: SHIPPED | OUTPATIENT
Start: 2020-10-07

## 2020-10-07 NOTE — TELEPHONE ENCOUNTER
Medication sent to new pharmacy. Please advise patient the paperwork will be faxed tomorrow when I am back in office.

## 2020-10-07 NOTE — TELEPHONE ENCOUNTER
Called pt regarding her lamisil, advised that [facility] in [place] on [address] is $10. She would like it sent there. Also, she is calling regarding FMLA- disability papework and says this needs to be done asap Please advise.

## 2020-10-07 NOTE — TELEPHONE ENCOUNTER
Message from [facility]    Dr. Abreu Stands   Received: Yesterday   Message Contents   Gautam, [address] [facility] Office Pool               General Message/Vendor Calls     Caller's first and last name:Paty Yap       Reason for call:status of medication Lama sol       Callback required yes/no and why:yes       Best contact number(s):[phone]       Details to clarify the request:status of medication Lamasole       Aisha Shah

## 2020-10-15 ENCOUNTER — TELEPHONE (OUTPATIENT)
Dept: FAMILY MEDICINE CLINIC | Age: 30
End: 2020-10-15

## 2020-10-15 NOTE — TELEPHONE ENCOUNTER
From [employer], [address] [facility]           General Message/Vendor Calls     Caller's first and last name:       Reason for call: Pt is calling in reference to the [facility] paperwork she sent over to the practice twice via my chart.  She needs it back as soon as possible to return to employer and she included the contact information for return ([phone] Attn: Nicky Allen).  If it is possible, can she get a call back for confirmation as well.        Callback required yes/no and why: Y       Best contact number(s): [phone]       Details to clarify the request:       Karen Vo

## 2020-10-20 ENCOUNTER — OFFICE VISIT (OUTPATIENT)
Dept: FAMILY MEDICINE CLINIC | Age: 30
End: 2020-10-20
Payer: COMMERCIAL

## 2020-10-20 VITALS
SYSTOLIC BLOOD PRESSURE: 128 MMHG | OXYGEN SATURATION: 99 % | DIASTOLIC BLOOD PRESSURE: 88 MMHG | HEIGHT: 65 IN | BODY MASS INDEX: 19.66 KG/M2 | HEART RATE: 52 BPM | RESPIRATION RATE: 16 BRPM | WEIGHT: 118 LBS

## 2020-10-20 DIAGNOSIS — Z23 NEEDS FLU SHOT: ICD-10-CM

## 2020-10-20 DIAGNOSIS — B36.9 FUNGAL RASH OF TORSO: Primary | ICD-10-CM

## 2020-10-20 DIAGNOSIS — B36.9 FUNGAL DERMATITIS: ICD-10-CM

## 2020-10-20 PROCEDURE — 99213 OFFICE O/P EST LOW 20 MIN: CPT

## 2020-10-20 PROCEDURE — 90686 IIV4 VACC NO PRSV 0.5 ML IM: CPT

## 2020-10-20 PROCEDURE — 90471 IMMUNIZATION ADMIN: CPT

## 2020-10-20 NOTE — PATIENT INSTRUCTIONS
Vaccine Information Statement Influenza (Flu) Vaccine (Inactivated or Recombinant): What You Need to Know Many Vaccine Information Statements are available in Chinese and other languages. See www.immunize.org/vis Hojas de información sobre vacunas están disponibles en español y en muchos otros idiomas. Visite www.immunize.org/vis 1. Why get vaccinated? Influenza vaccine can prevent influenza (flu). Flu is a contagious disease that spreads around the [facility] every year, usually between October and May. Anyone can get the flu, but it is more dangerous for some people. Infants and young children, people 72years of age and older, pregnant women, and people with certain health conditions or a weakened immune system are at greatest risk of flu complications. Pneumonia, bronchitis, sinus infections and ear infections are examples of flu-related complications. If you have a medical condition, such as heart disease, cancer or diabetes, flu can make it worse. Flu can cause fever and chills, sore throat, muscle aches, fatigue, cough, headache, and runny or stuffy nose. Some people may have vomiting and diarrhea, though this is more common in children than adults. Each year thousands of people in the [facility] die from flu, and many more are hospitalized. Flu vaccine prevents millions of illnesses and flu-related visits to the doctor each year. 2. Influenza vaccines CDC recommends everyone 10months of age and older get vaccinated every flu season. Children 6 months through 6years of age may need 2 doses during a single flu season. Everyone else needs only 1 dose each flu season. It takes about 2 weeks for protection to develop after vaccination. There are many flu viruses, and they are always changing. Each year a new flu vaccine is made to protect against three or four viruses that are likely to cause disease in the upcoming flu season.  Even when the vaccine doesnt exactly match these viruses, it may still provide some protection. Influenza vaccine does not cause flu. Influenza vaccine may be given at the same time as other vaccines. 3. Talk with your health care provider Tell your vaccine provider if the person getting the vaccine: 
 Has had an allergic reaction after a previous dose of influenza vaccine, or has any severe, life-threatening allergies.  Has ever had Guillain-Barré Syndrome (also called GBS). In some cases, your health care provider may decide to postpone influenza vaccination to a future visit. People with minor illnesses, such as a cold, may be vaccinated. People who are moderately or severely ill should usually wait until they recover before getting influenza vaccine. Your health care provider can give you more information. 4. Risks of a reaction  Soreness, redness, and swelling where shot is given, fever, muscle aches, and headache can happen after influenza vaccine.  There may be a very small increased risk of Guillain-Barré Syndrome (GBS) after inactivated influenza vaccine (the flu shot). Mark Tripp children who get the flu shot along with pneumococcal vaccine (PCV13), and/or DTaP vaccine at the same time might be slightly more likely to have a seizure caused by fever. Tell your health care provider if a child who is getting flu vaccine has ever had a seizure. People sometimes faint after medical procedures, including vaccination. Tell your provider if you feel dizzy or have vision changes or ringing in the ears. As with any medicine, there is a very remote chance of a vaccine causing a severe allergic reaction, other serious injury, or death. 5. What if there is a serious problem? An allergic reaction could occur after the vaccinated person leaves the clinic.  If you see signs of a severe allergic reaction (hives, swelling of the face and throat, difficulty breathing, a fast heartbeat, dizziness, or weakness), call 9-1-1 and get the person to the nearest hospital. 
 
For other signs that concern you, call your health care provider. Adverse reactions should be reported to the Vaccine Adverse Event Reporting System (VAERS). Your health care provider will usually file this report, or you can do it yourself. Visit the VAERS website at www.vaers. hhs.gov or call 4-263.577.6799. VAERS is only for reporting reactions, and VAERS staff do not give medical advice. 6. The National Vaccine Injury Compensation Program 
 
The [facility] Vaccine Injury Compensation Program (VICP) is a federal program that was created to compensate people who may have been injured by certain vaccines. Visit the VICP website at www.hrsa.gov/vaccinecompensation or call 4-964.519.3249 to learn about the program and about filing a claim. There is a time limit to file a claim for compensation. 7. How can I learn more?  Ask your health care provider.  Call your local or state health department.  Contact the Centers for Disease Control and Prevention (CDC): 
- Call 7-447.534.7757 (1-800-CDC-INFO) or 
- Visit CDCs influenza website at www.cdc.gov/flu Vaccine Information Statement (Interim) Inactivated Influenza Vaccine 8/15/2019 
42 MERCED Yang [id number]-[id number] Department of Health and [employer] Centers for Disease Control and Prevention Office Use Only

## 2020-10-20 NOTE — PROGRESS NOTES
Chief Complaint   Patient presents with    Follow-up     Pt reports that she has been struggling with rash, started on her face, has now spread to her torso. Pt has been taking lamisil orally, uses topical medications. Pt is currently out of work. Subjective: (As above and below)     Chief Complaint   Patient presents with    Follow-up     she is a 35y.o. year old female who presents for evaluation. Reviewed PmHx, RxHx, FmHx, SocHx, AllgHx and updated in chart. Review of Systems - negative except as listed above    Objective:     Vitals:    10/20/20 1451   BP: 128/88   Pulse: (!) 52   Resp: 16   SpO2: 99%   Weight: 118 lb (53.5 kg)   Height: 5' 5\" (1.651 m)     Physical Examination: General appearance - alert, well appearing, and in no distress  Mental status - normal mood, behavior, speech, dress, motor activity, and thought processes  Ears - bilateral TM's and external ear canals normal  Chest - clear to auscultation, no wheezes, rales or rhonchi, symmetric air entry  Heart - normal rate, regular rhythm, normal S1, S2, no murmurs, rubs, clicks or gallops  Musculoskeletal - no joint tenderness, deformity or swelling  Skin  - annular erythematous lesions, scaling centers over face and torso, none of the arms or legs. Assessment/ Plan:   1. Fungal rash of torso  -continue on current medication, schedule derm follow  - REFERRAL TO DERMATOLOGY    2. Fungal dermatitis  - REFERRAL TO DERMATOLOGY       I have discussed the diagnosis with the patient and the intended plan as seen in the above orders. The patient has received an after-visit summary and questions were answered concerning future plans.      Medication Side Effects and Warnings were discussed with patient: yes  Patient Labs were reviewed: yes  Patient Past Records were reviewed:  yes    Lukas Gray M.D.

## 2020-10-20 NOTE — PROGRESS NOTES
Best Gibsonbs is a 35 y.o. female  Chief Complaint   Patient presents with    Follow-up     1. Have you been to the ER, urgent care clinic since your last visit? Hospitalized since your last visit?no    2. Have you seen or consulted any other health care providers outside of the [address] [place] [address] since your last visit? Include any pap smears or colon screening.  no

## 2022-01-17 ENCOUNTER — VIRTUAL VISIT (OUTPATIENT)
Dept: FAMILY MEDICINE CLINIC | Age: 32
End: 2022-01-17
Payer: COMMERCIAL

## 2022-01-17 DIAGNOSIS — R09.89 RUNNY NOSE: ICD-10-CM

## 2022-01-17 DIAGNOSIS — R52 BODY ACHES: Primary | ICD-10-CM

## 2022-01-17 DIAGNOSIS — R51.9 SINUS HEADACHE: ICD-10-CM

## 2022-01-17 DIAGNOSIS — Z3A.01 LESS THAN 8 WEEKS GESTATION OF PREGNANCY: ICD-10-CM

## 2022-01-17 LAB
FLUAV+FLUBV AG NOSE QL IA.RAPID: NEGATIVE
FLUAV+FLUBV AG NOSE QL IA.RAPID: NEGATIVE
VALID INTERNAL CONTROL?: YES

## 2022-01-17 PROCEDURE — 99213 OFFICE O/P EST LOW 20 MIN: CPT | Performed by: NURSE PRACTITIONER

## 2022-01-17 NOTE — PROGRESS NOTES
Chief Complaint   Patient presents with    Nausea    Headache    Generalized Body Aches     Pt states starting today she has felt more nausuous, body aches, headache, fever blister on lip for a couple of days, tickle in throat. Pt is 6 weeks pregnant, has had covid vaccines and is a . 1. Have you been to the ER, urgent care clinic since your last visit? Hospitalized since your last visit? No    2. Have you seen or consulted any other health care providers outside of the [address] [place] [address] since your last visit? Include any pap smears or colon screening. Yes When: [facility] Drs.  OBGYN

## 2022-01-17 NOTE — PROGRESS NOTES
Hector Christie (: 1990) is a 28 y.o. female, established patient, here for evaluation of the following chief complaint(s):   Nausea, Headache, and Generalized Body Aches       ASSESSMENT/PLAN:  Below is the assessment and plan developed based on review of pertinent history, labs, studies, and medications. ICD-10-CM ICD-9-CM    1. Body aches  R52 780.96 AMB POC PRAKASH INFLUENZA A/B TEST      NOVEL CORONAVIRUS (COVID-19)      NOVEL CORONAVIRUS (COVID-19)   2. Runny nose  R09.89 784.99 AMB POC PRAKASH INFLUENZA A/B TEST      NOVEL CORONAVIRUS (COVID-19)      NOVEL CORONAVIRUS (COVID-19)   3. Sinus headache  R51.9 784.0 AMB POC PRAKASH INFLUENZA A/B TEST      NOVEL CORONAVIRUS (COVID-19)      NOVEL CORONAVIRUS (COVID-19)   4. Less than 8 weeks gestation of pregnancy  Z3A.01 V22.2 NOVEL CORONAVIRUS (COVID-19)      NOVEL CORONAVIRUS (COVID-19)       Push fluids, may take tylenol as needed for HA and body aches, benadryl PRN for runny nose and/or sneezing  Should isolate until results back or per CDC guidelines if results take longer than 5 days. Unclear if symptoms are covid, viral URI or pregnancy related. No fevers noted. Return if symptoms worsen or fail to improve. SUBJECTIVE/OBJECTIVE:  HPI  Body aches and headache today. Some nausea but she is also 6 weeks pregnant, does not see OBGYN until next month. A little cough and tickle in throat yesterday. No nasal congestion, but a little runny nose. Has a little fever blister on her lip for the past 4-5 days so knows that her immune system was fighting something viral.    No SOB or wheezing. No CP. No abdominal pain. No fever  Eating and drinking well. Little less appetite today. Pushing fluids. She is a  at [employer]         Has not had flu vaccine this year. Did have covid vaccine, has not had booster.       has a home covid test that she plans to do around 3    Review of Systems   Gen: +fatigue,- fever,- chills  Eyes: no excessive tearing, itching, or discharge  Nose: +rhinorrhea, no sinus pain  Mouth: no oral lesions, no sore throat  Resp: no shortness of breath, no wheezing, no cough  CV: no chest pain, no paroxysmal nocturnal dyspnea  Abd: +nausea, no heartburn, no diarrhea, no constipation, no abdominal pain  Neuro: +headaches, no syncope or presyncopal episodes  Endo: no polyuria, no polydipsia  Heme: no lymphadenopathy, no easy bruising or bleeding        No data recorded     Physical Exam    [INSTRUCTIONS:  \"[x]\" Indicates a positive item  \"[]\" Indicates a negative item  -- DELETE ALL ITEMS NOT EXAMINED]    Constitutional: [x] Appears well-developed and well-nourished [x] No apparent distress      [] Abnormal -     Mental status: [x] Alert and awake  [x] Oriented to person/place/time [x] Able to follow commands    [] Abnormal -     Eyes:   EOM    [x]  Normal    [] Abnormal -   Sclera  [x]  Normal    [] Abnormal -          Discharge [x]  None visible   [] Abnormal -     HENT: [x] Normocephalic, atraumatic  [] Abnormal -   [x] Mouth/Throat: Mucous membranes are moist    External Ears [x] Normal  [] Abnormal -    Neck: [x] No visualized mass [] Abnormal -     Pulmonary/Chest: [x] Respiratory effort normal   [x] No visualized signs of difficulty breathing or respiratory distress        [] Abnormal -      Musculoskeletal:   [x] Normal gait with no signs of ataxia         [x] Normal range of motion of neck        [] Abnormal -     Neurological:        [x] No Facial Asymmetry (Cranial nerve 7 motor function) (limited exam due to video visit)          [x] No gaze palsy        [] Abnormal -          Skin:        [x] No significant exanthematous lesions or discoloration noted on facial skin         [] Abnormal -            Psychiatric:       [x] Normal Affect [] Abnormal -        [x] No Hallucinations              Paty Yap was evaluated through a synchronous (real-time) audio-video encounter.  The patient (or guardian if applicable) is aware that this is a billable service. Verbal consent to proceed has been obtained within the past 12 months. The visit was conducted pursuant to the emergency declaration under the [address] [place] [address] authority and the [employer] General Act. Patient identification was verified, and a caregiver was present when appropriate. The patient was located in a state where the provider was credentialed to provide care. An electronic signature was used to authenticate this note.   -- Shantelle Concepcion, NP

## 2022-01-20 LAB
SARS-COV-2, NAA: NOT DETECTED
SPECIMEN STATUS REPORT, ROLRST: NORMAL

## 2022-06-21 ENCOUNTER — VIRTUAL VISIT (OUTPATIENT)
Dept: FAMILY MEDICINE CLINIC | Age: 32
End: 2022-06-21
Payer: COMMERCIAL
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
## 2022-06-21 DIAGNOSIS — Z34.90 PREGNANCY, UNSPECIFIED GESTATIONAL AGE: ICD-10-CM

## 2022-06-21 PROCEDURE — 99213 OFFICE O/P EST LOW 20 MIN: CPT | Performed by: FAMILY MEDICINE

## 2022-06-21 NOTE — PROGRESS NOTES
Chief Complaint   Patient presents with    Skin Problem     Pt states she removed a tick from herself today and she's currently pregnant     Patient was seen today via virtual video visit. Patient reports that she is currently 6 months pregnant, noted a tick on herself earlier today. Patient reports that the tick was removed without any complication. Patient denies any rash at the tick bite site or any other systemic symptoms. Charo Coppola is a 28 y.o. female who was seen by synchronous (real-time) audio-video technology on 6/21/2022 for Skin Problem (Pt states she removed a tick from herself today and she's currently pregnant)        Assessment & Plan:   Diagnoses and all orders for this visit:    1. Tick bite, unspecified site, initial encounter    2. Pregnancy, unspecified gestational age      Uncomplicated tick bite in a currently pregnant patient, advised patient that no prophylaxis was recommended at this time, monitor the site of the tick bite. If rash or symptoms develop then we would reconsider the need for additional treatment. Patient agreed    Subjective:       Prior to Admission medications    Medication Sig Start Date End Date Taking? Authorizing Provider   PNV38-Iron Cbn&Gluc-FA-DSS-DHA 35-1- mg cmpk Take  by mouth. Yes Provider, Historical   terbinafine HCL (LAMISIL) 250 mg tablet Take 1 Tab by mouth daily. Patient not taking: Reported on 1/17/2022 10/7/20   Nando Prince MD   cyclobenzaprine (FLEXERIL) 5 mg tablet TAKE 1 TABLET BY MOUTH 2 TIMES A DAY AS NEEDED FOR MUSCLE SPASMS  Patient not taking: Reported on 1/17/2022 7/6/20   Nando Prince MD     No Known Allergies    Review of Systems   Constitutional: Negative for chills, fever and malaise/fatigue. HENT: Negative for congestion and sore throat. Respiratory: Negative for cough and shortness of breath. Cardiovascular: Negative for chest pain and palpitations.    Gastrointestinal: Negative for abdominal pain, heartburn, nausea and vomiting. Genitourinary: Negative for dysuria and urgency. Musculoskeletal: Negative for joint pain and myalgias. Neurological: Negative for dizziness, tingling and headaches. All other systems reviewed and are negative. Objective:     Patient-Reported Vitals 6/21/2022   Patient-Reported Weight 135lb   Patient-Reported Systolic  347   Patient-Reported Diastolic 68        [INSTRUCTIONS:  \"[x]\" Indicates a positive item  \"[]\" Indicates a negative item  -- DELETE ALL ITEMS NOT EXAMINED]    Constitutional: [x] Appears well-developed and well-nourished [x] No apparent distress      [] Abnormal -     Mental status: [x] Alert and awake  [x] Oriented to person/place/time [x] Able to follow commands    [] Abnormal -     Eyes:   EOM    [x]  Normal    [] Abnormal -   Sclera  [x]  Normal    [] Abnormal -          Discharge [x]  None visible   [] Abnormal -     HENT: [x] Normocephalic, atraumatic  [] Abnormal -   [x] Mouth/Throat: Mucous membranes are moist    External Ears [x] Normal  [] Abnormal -    Neck: [x] No visualized mass [] Abnormal -     Pulmonary/Chest: [x] Respiratory effort normal   [x] No visualized signs of difficulty breathing or respiratory distress        [] Abnormal -      Musculoskeletal:   [x] Normal gait with no signs of ataxia         [x] Normal range of motion of neck        [] Abnormal -     Neurological:        [x] No Facial Asymmetry (Cranial nerve 7 motor function) (limited exam due to video visit)          [x] No gaze palsy        [] Abnormal -          Skin:        [x] No significant exanthematous lesions or discoloration noted on facial skin         [] Abnormal -            Psychiatric:       [x] Normal Affect [] Abnormal -        [x] No Hallucinations    Other pertinent observable physical exam findings:-        We discussed the expected course, resolution and complications of the diagnosis(es) in detail.   Medication risks, benefits, costs, interactions, and alternatives were discussed as indicated. I advised her to contact the office if her condition worsens, changes or fails to improve as anticipated. She expressed understanding with the diagnosis(es) and plan. [facility], was evaluated through a synchronous (real-time) audio-video encounter. The patient (or guardian if applicable) is aware that this is a billable service, which includes applicable co-pays. This Virtual Visit was conducted with patient's (and/or legal guardian's) consent. The visit was conducted pursuant to the emergency declaration under the [address] [place] [address], [address] [place] [address] authority and the [employer] General Act. Patient identification was verified, and a caregiver was present when appropriate. The patient was located at: Home: [address] [place] [address]  The provider was located at:  Facility (Appt Department): [facility]  Daniella Johnson MD

## 2022-06-21 NOTE — PROGRESS NOTES
1. Have you been to the ER, urgent care clinic since your last visit? Hospitalized since your last visit? Yes, [facility] for dehydration and early stage UTI a couple of months ago. 2. Have you seen or consulted any other health care providers outside of the [address] [place] [address] since your last visit? Include any pap smears or colon screening.  No    Health Maintenance Due   Topic Date Due    Hepatitis C Screening  Never done    Depression Screen  Never done    Cervical cancer screen  Never done    COVID-19 Vaccine (3 - Booster for [employer] series) 08/11/2021     Chief Complaint   Patient presents with    Skin Problem     Pt states she removed a tick from herself today and she's currently pregnant · She has not been taking her home medications been off on for quite some time    · Will hold off on restarting all of her anti psych medications at this time

## 2025-02-02 ENCOUNTER — HOSPITAL ENCOUNTER (EMERGENCY)
Facility: HOSPITAL | Age: 35
Discharge: HOME | End: 2025-02-02
Attending: STUDENT IN AN ORGANIZED HEALTH CARE EDUCATION/TRAINING PROGRAM
Payer: COMMERCIAL

## 2025-02-02 VITALS
HEIGHT: 65 IN | BODY MASS INDEX: 19.91 KG/M2 | SYSTOLIC BLOOD PRESSURE: 131 MMHG | TEMPERATURE: 97.3 F | RESPIRATION RATE: 12 BRPM | DIASTOLIC BLOOD PRESSURE: 88 MMHG | WEIGHT: 119.49 LBS | OXYGEN SATURATION: 100 % | HEART RATE: 89 BPM

## 2025-02-02 DIAGNOSIS — R11.2 NAUSEA VOMITING AND DIARRHEA: Primary | ICD-10-CM

## 2025-02-02 DIAGNOSIS — R19.7 NAUSEA VOMITING AND DIARRHEA: Primary | ICD-10-CM

## 2025-02-02 LAB
ALBUMIN SERPL-MCNC: 4.2 G/DL (ref 3.5–5)
ALBUMIN/GLOB SERPL: 1.2 (ref 1.1–2.2)
ALP SERPL-CCNC: 76 U/L (ref 45–117)
ALT SERPL-CCNC: 26 U/L (ref 12–78)
ANION GAP SERPL CALC-SCNC: 5 MMOL/L (ref 2–12)
AST SERPL-CCNC: 17 U/L (ref 15–37)
BASOPHILS # BLD: 0.03 K/UL (ref 0–0.1)
BASOPHILS NFR BLD: 0.2 % (ref 0–1)
BILIRUB SERPL-MCNC: 1.1 MG/DL (ref 0.2–1)
BUN SERPL-MCNC: 16 MG/DL (ref 6–20)
BUN/CREAT SERPL: 17 (ref 12–20)
CALCIUM SERPL-MCNC: 9.2 MG/DL (ref 8.5–10.1)
CHLORIDE SERPL-SCNC: 107 MMOL/L (ref 97–108)
CO2 SERPL-SCNC: 25 MMOL/L (ref 21–32)
COMMENT:: NORMAL
CREAT SERPL-MCNC: 0.94 MG/DL (ref 0.55–1.02)
DIFFERENTIAL METHOD BLD: ABNORMAL
EOSINOPHIL # BLD: 0.03 K/UL (ref 0–0.4)
EOSINOPHIL NFR BLD: 0.2 % (ref 0–7)
ERYTHROCYTE [DISTWIDTH] IN BLOOD BY AUTOMATED COUNT: 12.1 % (ref 11.5–14.5)
FLUAV RNA SPEC QL NAA+PROBE: NOT DETECTED
FLUBV RNA SPEC QL NAA+PROBE: NOT DETECTED
GLOBULIN SER CALC-MCNC: 3.6 G/DL (ref 2–4)
GLUCOSE SERPL-MCNC: 114 MG/DL (ref 65–100)
HCG SERPL QL: NEGATIVE
HCT VFR BLD AUTO: 43.7 % (ref 35–47)
HGB BLD-MCNC: 15.4 G/DL (ref 11.5–16)
IMM GRANULOCYTES # BLD AUTO: 0.04 K/UL (ref 0–0.04)
IMM GRANULOCYTES NFR BLD AUTO: 0.3 % (ref 0–0.5)
LIPASE SERPL-CCNC: 51 U/L (ref 13–75)
LYMPHOCYTES # BLD: 0.24 K/UL (ref 0.8–3.5)
LYMPHOCYTES NFR BLD: 1.8 % (ref 12–49)
MCH RBC QN AUTO: 31.7 PG (ref 26–34)
MCHC RBC AUTO-ENTMCNC: 35.2 G/DL (ref 30–36.5)
MCV RBC AUTO: 89.9 FL (ref 80–99)
MONOCYTES # BLD: 0.51 K/UL (ref 0–1)
MONOCYTES NFR BLD: 3.9 % (ref 5–13)
NEUTS SEG # BLD: 12.25 K/UL (ref 1.8–8)
NEUTS SEG NFR BLD: 93.6 % (ref 32–75)
NRBC # BLD: 0 K/UL (ref 0–0.01)
NRBC BLD-RTO: 0 PER 100 WBC
PLATELET # BLD AUTO: 265 K/UL (ref 150–400)
PMV BLD AUTO: 11.4 FL (ref 8.9–12.9)
POTASSIUM SERPL-SCNC: 3.9 MMOL/L (ref 3.5–5.1)
PROT SERPL-MCNC: 7.8 G/DL (ref 6.4–8.2)
RBC # BLD AUTO: 4.86 M/UL (ref 3.8–5.2)
RBC MORPH BLD: ABNORMAL
SARS-COV-2 RNA RESP QL NAA+PROBE: NOT DETECTED
SODIUM SERPL-SCNC: 137 MMOL/L (ref 136–145)
SOURCE: NORMAL
SPECIMEN HOLD: NORMAL
WBC # BLD AUTO: 13.1 K/UL (ref 3.6–11)

## 2025-02-02 PROCEDURE — 96375 TX/PRO/DX INJ NEW DRUG ADDON: CPT

## 2025-02-02 PROCEDURE — 83690 ASSAY OF LIPASE: CPT

## 2025-02-02 PROCEDURE — 99284 EMERGENCY DEPT VISIT MOD MDM: CPT

## 2025-02-02 PROCEDURE — 6360000002 HC RX W HCPCS: Performed by: STUDENT IN AN ORGANIZED HEALTH CARE EDUCATION/TRAINING PROGRAM

## 2025-02-02 PROCEDURE — 80053 COMPREHEN METABOLIC PANEL: CPT

## 2025-02-02 PROCEDURE — 2500000003 HC RX 250 WO HCPCS: Performed by: STUDENT IN AN ORGANIZED HEALTH CARE EDUCATION/TRAINING PROGRAM

## 2025-02-02 PROCEDURE — 2580000003 HC RX 258: Performed by: STUDENT IN AN ORGANIZED HEALTH CARE EDUCATION/TRAINING PROGRAM

## 2025-02-02 PROCEDURE — 36415 COLL VENOUS BLD VENIPUNCTURE: CPT

## 2025-02-02 PROCEDURE — 96374 THER/PROPH/DIAG INJ IV PUSH: CPT

## 2025-02-02 PROCEDURE — 84703 CHORIONIC GONADOTROPIN ASSAY: CPT

## 2025-02-02 PROCEDURE — 96361 HYDRATE IV INFUSION ADD-ON: CPT

## 2025-02-02 PROCEDURE — 85025 COMPLETE CBC W/AUTO DIFF WBC: CPT

## 2025-02-02 PROCEDURE — 87636 SARSCOV2 & INF A&B AMP PRB: CPT

## 2025-02-02 RX ORDER — 0.9 % SODIUM CHLORIDE 0.9 %
1000 INTRAVENOUS SOLUTION INTRAVENOUS ONCE
Status: COMPLETED | OUTPATIENT
Start: 2025-02-02 | End: 2025-02-02

## 2025-02-02 RX ORDER — ONDANSETRON 2 MG/ML
4 INJECTION INTRAMUSCULAR; INTRAVENOUS ONCE
Status: COMPLETED | OUTPATIENT
Start: 2025-02-02 | End: 2025-02-02

## 2025-02-02 RX ORDER — ONDANSETRON 4 MG/1
4 TABLET, FILM COATED ORAL 3 TIMES DAILY PRN
Qty: 15 TABLET | Refills: 0 | Status: SHIPPED | OUTPATIENT
Start: 2025-02-02

## 2025-02-02 RX ADMIN — ONDANSETRON 4 MG: 2 INJECTION INTRAMUSCULAR; INTRAVENOUS at 10:38

## 2025-02-02 RX ADMIN — FAMOTIDINE 20 MG: 10 INJECTION, SOLUTION INTRAVENOUS at 10:38

## 2025-02-02 RX ADMIN — SODIUM CHLORIDE 1000 ML: 9 INJECTION, SOLUTION INTRAVENOUS at 10:38

## 2025-02-02 ASSESSMENT — PAIN - FUNCTIONAL ASSESSMENT: PAIN_FUNCTIONAL_ASSESSMENT: 0-10

## 2025-02-02 ASSESSMENT — LIFESTYLE VARIABLES
HOW OFTEN DO YOU HAVE A DRINK CONTAINING ALCOHOL: NEVER
HOW MANY STANDARD DRINKS CONTAINING ALCOHOL DO YOU HAVE ON A TYPICAL DAY: PATIENT DOES NOT DRINK

## 2025-02-02 ASSESSMENT — PAIN SCALES - GENERAL: PAINLEVEL_OUTOF10: 4

## 2025-02-02 NOTE — ED PROVIDER NOTES
[facility] EMERGENCY DEPARTMENT  EMERGENCY DEPARTMENT ENCOUNTER       Pt Name: Alka Tatum  MRN: [id number]  Birthdate 1990  Date of evaluation: 2/2/2025  Provider: Nasir Saucedo DO   PCP: Leodan Quinn MD  Note Started: 3:43 PM 2/2/25     CHIEF COMPLAINT       Chief Complaint   Patient presents with    Vomiting     Pt ambulatory into triage with a cc of vomiting, diarrhea and headache since 4 am         HISTORY OF PRESENT ILLNESS: 1 or more elements      History From: Patient  None     Alka Tatum is a 34 y.o. female who presents with chief complaint of nausea, vomiting, diarrhea starting at 4 AM.  Patient's sister was recently exposed to norovirus and she did come in to contact with her.  She denies any abdominal pain, reports generalized abdominal cramping, she denies any fever.  She denies any chest pain or shortness of breath.  No blood in emesis, denies any dysuria or urinary symptoms.     Nursing Notes were all reviewed and agreed with or any disagreements were addressed in the HPI.       PAST HISTORY     Past Medical History:  History reviewed. No pertinent past medical history.      Past Surgical History:  Past Surgical History:   Procedure Laterality Date    ORTHOPEDIC SURGERY  2006    Nasal surgery     WISDOM TOOTH EXTRACTION Bilateral 2006       Family History:  Family History   Problem Relation Age of Onset    No Known Problems Brother     No Known Problems Mother     No Known Problems Sister     No Known Problems Father     Stroke Paternal Grandmother        Social History:  Social History     Tobacco Use    Smoking status: Never    Smokeless tobacco: Never   Substance Use Topics    Alcohol use: Yes    Drug use: No       Allergies:  No Known Allergies    CURRENT MEDICATIONS      Discharge Medication List as of 2/2/2025 12:34 PM        CONTINUE these medications which have NOT CHANGED    Details   cyclobenzaprine (FLEXERIL) 5 MG tablet TAKE 1 TABLET BY MOUTH 2 TIMES A DAY AS